# Patient Record
Sex: FEMALE | Race: WHITE | NOT HISPANIC OR LATINO | Employment: FULL TIME | URBAN - METROPOLITAN AREA
[De-identification: names, ages, dates, MRNs, and addresses within clinical notes are randomized per-mention and may not be internally consistent; named-entity substitution may affect disease eponyms.]

---

## 2023-02-03 ENCOUNTER — TRANSFERRED RECORDS (OUTPATIENT)
Dept: HEALTH INFORMATION MANAGEMENT | Facility: CLINIC | Age: 33
End: 2023-02-03
Payer: COMMERCIAL

## 2023-02-13 ENCOUNTER — MEDICAL CORRESPONDENCE (OUTPATIENT)
Dept: HEALTH INFORMATION MANAGEMENT | Facility: CLINIC | Age: 33
End: 2023-02-13
Payer: COMMERCIAL

## 2023-02-15 ENCOUNTER — TRANSCRIBE ORDERS (OUTPATIENT)
Dept: OTHER | Age: 33
End: 2023-02-15

## 2023-02-15 DIAGNOSIS — R07.0 THROAT PAIN: Primary | ICD-10-CM

## 2023-02-15 DIAGNOSIS — R09.89 THROAT CLEARING: ICD-10-CM

## 2023-04-28 NOTE — TELEPHONE ENCOUNTER
FUTURE VISIT INFORMATION      FUTURE VISIT INFORMATION:    Date: 7/3/23    Time: 1pm    Location: [facility]  REFERRAL INFORMATION:    Referring provider:  Dr Angel Cannon    Referring providers clinic:  [facility]    Reason for visit/diagnosis  New per ref/pt, chronic throat clearing, Referred by: Dr Angel Cannon @ [facility] Phone: [phone], Fax: [phone], ref-recs i9n epic, confirmed [facility]    RECORDS REQUESTED FROM:       Clinic name Comments Records Status Imaging Status   [facility]  2/13/23, 2/3/23- referral and note from Dr Angel Cannon Scanned in epic

## 2023-05-14 ENCOUNTER — HEALTH MAINTENANCE LETTER (OUTPATIENT)
Age: 33
End: 2023-05-14

## 2023-06-30 ENCOUNTER — TELEPHONE (OUTPATIENT)
Dept: OTOLARYNGOLOGY | Facility: CLINIC | Age: 33
End: 2023-06-30
Payer: COMMERCIAL

## 2023-06-30 NOTE — TELEPHONE ENCOUNTER
Left vm message for patient in regards to confirming upcoming appt on 7\3. Call center number provided for call back

## 2023-07-03 ENCOUNTER — PRE VISIT (OUTPATIENT)
Dept: OTOLARYNGOLOGY | Facility: CLINIC | Age: 33
End: 2023-07-03

## 2023-07-03 ENCOUNTER — VIRTUAL VISIT (OUTPATIENT)
Dept: OTOLARYNGOLOGY | Facility: CLINIC | Age: 33
End: 2023-07-03
Attending: STUDENT IN AN ORGANIZED HEALTH CARE EDUCATION/TRAINING PROGRAM
Payer: COMMERCIAL

## 2023-07-03 VITALS
HEIGHT: 64 IN | WEIGHT: 144 LBS | BODY MASS INDEX: 24.59 KG/M2 | SYSTOLIC BLOOD PRESSURE: 130 MMHG | DIASTOLIC BLOOD PRESSURE: 85 MMHG | HEART RATE: 103 BPM

## 2023-07-03 DIAGNOSIS — J38.7 LESION OF LARYNX: ICD-10-CM

## 2023-07-03 DIAGNOSIS — R09.89 CHRONIC THROAT CLEARING: Primary | ICD-10-CM

## 2023-07-03 DIAGNOSIS — R09.89 THROAT CLEARING: ICD-10-CM

## 2023-07-03 DIAGNOSIS — J38.7 IRRITABLE LARYNX SYNDROME: ICD-10-CM

## 2023-07-03 DIAGNOSIS — R09.89 THROAT TIGHTNESS: Primary | ICD-10-CM

## 2023-07-03 DIAGNOSIS — J38.3 PARADOXICAL VOCAL FOLD MOVEMENT: ICD-10-CM

## 2023-07-03 DIAGNOSIS — J38.7 LARYNGEAL HYPERFUNCTION: ICD-10-CM

## 2023-07-03 PROCEDURE — 99203 OFFICE O/P NEW LOW 30 MIN: CPT | Mod: 25 | Performed by: OTOLARYNGOLOGY

## 2023-07-03 PROCEDURE — 92524 BEHAVRAL QUALIT ANALYS VOICE: CPT | Mod: GN | Performed by: SPEECH-LANGUAGE PATHOLOGIST

## 2023-07-03 PROCEDURE — 31575 DIAGNOSTIC LARYNGOSCOPY: CPT | Performed by: OTOLARYNGOLOGY

## 2023-07-03 PROCEDURE — 92507 TX SP LANG VOICE COMM INDIV: CPT | Mod: GN | Performed by: SPEECH-LANGUAGE PATHOLOGIST

## 2023-07-03 RX ORDER — LEVONORGESTREL 13.5 MG/1
INTRAUTERINE DEVICE INTRAUTERINE
COMMUNITY
End: 2024-03-15

## 2023-07-03 ASSESSMENT — PAIN SCALES - GENERAL: PAINLEVEL: NO PAIN (0)

## 2023-07-03 NOTE — PATIENT INSTRUCTIONS
1.  You were seen in the ENT Clinic today by Dr. Rodriguez. If you have any questions or concerns after your appointment, please call [phone]. Press option #1 for scheduling related needs. Press option #3 for Nurse advice.    2. Plan is to return to clinic 4 months      Kasandra England RN  [phone]  [facility] - Otolaryngology

## 2023-07-03 NOTE — LETTER
7/3/2023       RE: Diana Rothman  [address]  [place] [address]     Dear Colleague,    Thank you for referring your patient, Diana Rothman, to the [facility]ICE [facility] [place] [facility]. Please see a copy of my visit note below.    Diana Rothman is a 33 year old female who is being evaluated via a billable video visit.      Diana has been notified and verbally consented to the following:   This video visit will be conducted between you and your provider.  Patient has opted to conduct today's video visit vs an in-person appointment.   Video visits are billed at different rates depending on your insurance coverage. Please reach out to your insurance provider with any questions.   If during the course of the call the provider feels the appointment is not appropriate, you will not be charged for this service.  Provider has received verbal consent for billable virtual visit from the patient? Yes  Will anyone else be joining your video visit? No    Call initiated at: 12:55pm   Type of Visit Platform Used: [employer]well Video for patient history but then transitioned to in person for laryngeal examination.   Location of provider: [facility]  Location of patient: [facility]  Luis Mcneil Jr., M.D., F.A.C.S.  Annie Rodriguez M.D., M.P.H.  Khalida Fairchild M.D.  Azeb Olvera, Ph.D., CCC-SLP  Himanshu Ramesh, Ph.D., CCC-SLP  Dora Prince M.M. (voice), M.A., CCC-SLP  Dorie Manuel M.A., CCC-SLP  ANURAG MedinaS., CCC-SLP  ABIOLA Oliveira (voice), M.S., CCC-SLP    [facility]  VOICE/SPEECH/BREATHING EVALUATION AND LARYNGEAL EXAMINATION REPORT    Patient: Diana Rothman  Date of Visit: 7/3/2023    Clinician: Dorie Manuel M.A., CCC-SLP  Seen in conjunction with: Dr. Annie Rodriguez  Referring Physician:  Kassandra Ortiz MD    CHIEF COMPLAINT:  Chronic throat  "clearing    HISTORY  Diana Rothman is a 33 year old female presenting today for evaluation of chronic throat clearing.  She clears her throat often because she feels like she always has something in her throat. Whenever she talks, she has to clear her throat. She comments that her mom frequently throat clears.     Please refer to Dr. Rodriguez s dictation for a more complete history and impressions.     VOICE  No complaints  Current Symptoms:  She throat clears before she speaks because she feels that \"it will interact\" with her voice.   She may get a hoarse voice every once in while if she has been too loud but this is not a common thing.   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  She feels a \"catch\" in her throat and sometimes a \"mucus\" feeling  She will cough every once in while to \"get whatever is there to get out.\"   The throat clearing happens all the time and she hasn't noticed a specific pattern  She notices it more when she is not talking, but she doesn't notice it as much when talking. Again, she isn't sure.  No coughing or throat clearing at night   Improves with: she hasn't noticed anything to make it better  Worsens with: She hasn't noticed anything that makes it worse   her cough/ throat clearing triggers include: none    SWALLOWING  No complaints    BREATHING  No complaints  Current Symptoms:   Diagnosed with asthmatic bronchitis because her \"lungs were wheezing.\"   She can feel winded with exercising or doing house projects (things that have more physical exertion)    THROAT  Current Symptoms:   Her throat gets tight when she is out of breath but she knows that she can still breathe    OTHER PERTINENT HISTORY  Reflux;   Very rarely (once a " year)  She will feel drainage from her nose whenever she feels sick.  She has had tonsillitis frequently this past year.   She feels like she has a sore throat more often than normal but it doesn't affect her with work .  Non-smoker and she drinks alcohol a few times a week   Please refer to Dr. Rodriguez's note and chart for additional history    OBJECTIVE FINDINGS  VOICE/ SPEECH/ NON-COMMUNICATIVE LARYNGEAL BEHAVIORS EVALUATION  An evaluation of the voice and upper airway was accomplished today; salient features are as follows:  Palpation of the laryngeal area shows:  No tenderness of the thyrohyoid area   Reduced thyrohyoid space   Cough/ Throat clear:  Frequent  Dry  No overt tension is evident.  Voice quality is characterized by  Frequent vocal arana  Habitual pitch was not formally tested, but is judged to be WNL and appropriate  Loudness is WNL and appropriate for the setting  A singing task shows voice quality is slightly improved but overall consistent between speech and singing  GLOBAL ASSESSMENT OF DYSPHONIA: 2 /100    LARYNGEAL EXAMINATION    Endoscopic laryngeal examination was performed by:  Dr. Rodriguez  I assisted Dr. Rodriguez with the protocol of instructions for the patient.  Type of exam:   Flexible endoscopy; topical anesthesia with 2% Lidocaine and 0.025% oxymetazoline was applied to both nares.    This exam shows:  Laryngeal and Vocal Fold Mucosa  Erythema of the medial arytenoid walls   No remarkable signs of reflux  Minimal presence of bubbly and sticky secretions on the vocal folds and throughout the laryngeal area  Status of vocal fold mucosa:   White granulation observed by the left vocal fold process at the left medial arytenoid wall  Very slight varix observed at the left vocal fold near the medial edge (mid fold area)  Otherwise within normal limits, with no lesions and straight vibratory margins  Narrow Band Imaging yielded the following: apparent white granulation observed by the left vocal  "fold process at the left medial arytenoid wall. Mild erythema observed bilaterally on the vocal folds L>R.    Neurological and Functional Integrity of the Larynx  Vocal folds are mobile and meet at midline; movement is brisk and symmetric; exam is neurologically normal   Airway is adequate  Slight droopiness observed of the right arytenoid at rest.  Elongation of the vocal folds for pitch increase is WNL   With simulated heavy breathing, anterior rocking of arytenoids was seen. However, the vocal folds remained opened.    Supraglottic Function and Therapy Probes  Variable mild-moderate four-way constriction of the supraglottic larynx during connected speech  Supraglottic function during singing is improved  Therapy probes show   She was responsive to the rounded lip inhale, puppy sniffs, and exhaling on \"Sh\" and like blowing out candles.    Dr. Fairchild and I reviewed this laryngeal exam with Ms. Rothman today, and I provided pertinent explanations:  Endoscopic findings are consistent with audio-perceptual assessment and patient Hx/complaints.  her symptoms are accounted for by variable mild-moderate four-way constriction of the supraglottic larynx during connected speech  Because there appears to be a functional component to her symptoms, she would most likely benefit from functional speech therapy.    THERAPEUTIC ACTIVITIES  Today Ms. Rothman participated in the following therapeutic activities:  I provided instruction for techniques and strategies to reduce the chronic cough/throat clearing  alternative behaviors such as voiceless glottic coup, sipping water, gargling,swallowing, etc. were taught  strategies for reducing mucosal irritation were taught  Gargle protocol instructions were provided  I provided an after visit summary to help facilitate practice.    ASSESSMENT / PLAN  IMPRESSIONS:  This evaluation has resulted in the following diagnosis/diagnoses for Ms. 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)    Laryngeal evaluation demonstrated white granulation observed by the left vocal fold process at the left medial arytenoid wall; very slight varix observed at the left vocal fold near the medial edge (mid fold area); Narrow Band Imaging yielded the following: apparent white granulation observed by the left vocal fold process at the left medial arytenoid wall and mild erythema observed bilaterally on the vocal folds L>R; erythema of the medial arytenoid walls; with simulated heavy breathing, anterior rocking of arytenoids was seen, however, the vocal folds remained opened; she was responsive to the rounded lip inhale, puppy sniffs, and exhaling on \"Sh\" and like blowing out candles; slight droopiness observed of the right arytenoid at rest; variable mild-moderate four-way constriction of the supraglottic larynx during connected speech  Perceptual evaluation demonstrated frequent vocal arana.    STIMULABILITY: results of therapy probes during perceptual and laryngeal evaluation demonstrate improvement with use of rescue breathing strategies  RECOMMENDATIONS:   A course of speech therapy is recommended to help reduce throat clear and mucosal irritation.  She demonstrates a Good prognosis for improvement given adherence to therapeutic recommendations. Therapeutic   Positive indicators: commitment to process; diagnosis is known to respond to functional treatment;   Negative indicators: none  We briefly began therapy today, working on strategies to reduce throat clearing and laryngeal irritation.     TREATMENT PLAN  Speech therapy    DURATION/FREQUENCY OF TREATMENT  4 weekly or bi-weekly one-hour sessions, with 1 monthly one-hour follow-up sessions    GOALS  Patient goal:    To understand the problem and fix it as much as possible  To reduce her cough to acceptable " levels    Long-term goal(s): In 9 months, Ms. Rothman will:  1.  Report a week with no more than two episodes of coughing or throat-clearing per day, that do not last more than two seconds  Report resolution of symptoms, and use of optimal voice quality and comfort to meet personal, social, and professional needs, 90% of the time during a typical week of vocal activities    This treatment plan was developed with the patient who agreed with the recommendations.    TOTAL SERVICE TIME: 60 minutes  EVALUATION OF VOICE AND RESONANCE (81842)  TREATMENT (35346)  NO CHARGE FACILITY FEE (21126)      Dorie Manuel M.A., CCC-SLP  Speech-Language Pathologist  [facility]  [email or web address]  She/Her/Hers       Speech recognition software may have been used in this documentation; input is reviewed before signature to the best of my ability.       Again, thank you for allowing me to participate in the care of your patient.      Sincerely,    Speech Language Pathologist

## 2023-07-03 NOTE — PROGRESS NOTES
Dear Dr. Cannon:    I had the pleasure of meeting Diana Rothman in consultation at the [facility] of the [facility] at your request, for evaluation of chronic throat-clearing. The note from our visit follows. Speech recognition software may have been used in the documentation below; input is reviewed before signature to the best of my ability. I appreciate the opportunity to participate in the care of this pleasant patient.    Please feel free to contact me with any questions.    Sincerely yours,      Annie Rodriguez M.D., M.P.H.  , Laryngology  Director, [facility]  Otolaryngology- Head & Neck Surgery  [phone]          =====    HISTORY OF PRESENT ILLNESS:   Diana Rothman is a pleasant 33-year-old female, generally healthy, who presents with a long history of chronic throat-clearing. Symptoms include dry throat and mucus in throat.      Voice  No concerns.       Swallowing  No concerns.       Cough/Throat-clearing  Throat clearing is her primary concern. This has been going on for at least all of her adulthood.    1-2x/hour, she feels a catch or a mucus feeling in her throat. It feels like she needs to throat-clear before speaking.    The cough is usually dry/nonproductive, and starts with a sensation in the throat. Sometimes she coughs to get rid of the feeling. It is difficult to tell if there is a pattern. Her mother also does the same thing.     This is stable over time.      Breathing  She does get winded with physical exertion, her throat can get tight but she is still able to breathe.    She had been diagnosed with asthmatic bronchitis in the past.      Throat discomfort  As above.      Reflux-type symptoms  She experiences heartburn/indigestion rarely. She is not taking reflux medications.      Other  She does not generally experience post-nasal drainage.  She had tonsillitis multiple times  last year.      Prior Epic/outside records were reviewed for this visit, includin23 Dr. Cannon      MEDICATIONS:     Current Outpatient Medications   Medication Sig Dispense Refill     levonorgestrel (RADHA) 13.5 MG IUD Radha         ALLERGIES:  No Known Allergies    PAST MEDICAL HISTORY: No past medical history on file.     PAST SURGICAL HISTORY: No past surgical history on file.   No PSH    HABITS/SOCIAL HISTORY:    Social History     Tobacco Use     Smoking status: Not on file     Smokeless tobacco: Not on file   Substance Use Topics     Alcohol use: Not on file     No smoking.  Alcohol few times a week, rare heavy drinking. No definite impact on throat clearing.    FAMILY HISTORY:  No family history on file. Noncontributory.    REVIEW OF SYSTEMS:  Comprehensive 11 point review of systems was reviewed. Positives are as noted below; pertinent findings are as noted in the HPI.     Patient Supplied Answers to Review of Systems      2023    10:26 AM    ENT ROS   Constitutional Weight gain    Appetite change    Problems with sleep   Psychology Frequently feeling anxious   Ears, Nose, Throat Nasal congestion or drainage   Gastrointestinal/Genitourinary Unexplained nausea/vomiting   Allergy/Immunology Skin changes   Endocrine Thirst       PHYSICAL EXAMINATION:  General: The patient was alert and conversant, and in no acute distress.    Eyes: PERRL, conjunctiva and lids normal, sclera nonicteric.  Nose: Anterior rhinoscopy: no gross abnormalities. no  bleeding; no  mucopurulence; septum grossly normal, mild mucoid drainage and/or crusting.  Oral cavity/oropharynx: No masses or lesions. Dentition in good condition. Floor of mouth and oral tongue soft to palpation. Tongue mobility and palate elevation intact and symmetric.  Ears: Normal auricles, external auditory canals bilaterally. Visualized portions of tympanic membranes normal bilaterally. Moderate cerumen bilaterally.  Neck: No palpable cervical  lymphadenopathy. There was no significant tenderness to palpation of the thyrohyoid space, which was narrow. No obvious thyroid abnormality. Landmarks palpable.  Resp: Breathing comfortably, no stridor or stertor.  Neuro: Symmetric facial function. Other cranial nerves as documented above.  Psych: Normal affect, pleasant and cooperative.  Voice/speech: No significant dysphonia.  Extremities: No cyanosis, clubbing, or edema of the upper extremities.      PROCEDURE:   Flexible fiberoptic laryngoscopy  Indications: This procedure was warranted to evaluate the patient's laryngeal anatomy and function. Risks, benefits, and alternatives were discussed.  Description: After written informed consent was obtained, a time-out was performed to confirm patient identity, procedure, and procedure site. Topical 3% lidocaine with 0.25% phenylephrine was applied to the nasal cavities. I performed the endoscopy and no complications were apparent.  Performed by: Annie Rodriguez MD MPH  SLP: Dorie Manuel MA, CCC-SLP   Findings: Normal nasopharynx. Normal base of tongue, valleculae, and epiglottis. The right true vocal fold demonstrated normal mobility. The left true vocal fold demonstrated normal mobility. The medial edges of the true vocal folds appeared smooth and straight. No focal mucosal lesions were observed on the true vocal folds. Glissade produced appropriate elongation. There was mild to moderate supraglottic recruitment with connected speech. Mucosa of the false vocal folds, aryepiglottic folds, piriform sinuses, and posterior glottis unremarkable with the exception of mucosal irritation along medial surface of left arytenoid. Airway was patent, but with simulated heavy breathing, anterior rocking of arytenoids was seen. Response to the therapy probes was good. Similar findings on NBI.                   IMPRESSION AND PLAN:   Diana Rothman presents with chronic throat clearing associated with mucosal irritation  along the left medial arytenoid and mild dynamic/inducible laryngeal obstruction. Based on history, she does not appear to have reflux or allergies as contributing factors, although these have not been formally assessed.    I recommended speech therapy as initial primary management, with goals including reducing laryngeal irritability, decreasing vocal fold trauma, improving respiratory/phonatory coordination and improving respiratory mechanics.       I asked the patient to return in four months, or sooner as needed.  I appreciate the opportunity to participate in the care of this patient.     I spent a total of 37 minutes on 7/3/2023 in chart review, review of tests, patient visit, documentation, care coordination, and/or discussion with other providers about the issues documented above, separate from any documented procedure(s).

## 2023-07-03 NOTE — LETTER
7/3/2023      RE: Diana Rothman  [address]  [place] [address]       Dear Dr. Cannon:    I had the pleasure of meeting Diana Rothman in consultation at the [facility] of the [facility] at your request, for evaluation of chronic throat-clearing. The note from our visit follows. Speech recognition software may have been used in the documentation below; input is reviewed before signature to the best of my ability. I appreciate the opportunity to participate in the care of this pleasant patient.    Please feel free to contact me with any questions.    Sincerely yours,      Annie Rodriguez M.D., M.P.H.  , Laryngology  Director, [facility] Voice [facility]  Otolaryngology- Head & Neck Surgery  [phone]          =====    HISTORY OF PRESENT ILLNESS:   Diana Rothman is a pleasant 33-year-old female, generally healthy, who presents with a long history of chronic throat-clearing. Symptoms include dry throat and mucus in throat.      Voice  No concerns.       Swallowing  No concerns.       Cough/Throat-clearing  Throat clearing is her primary concern. This has been going on for at least all of her adulthood.    1-2x/hour, she feels a catch or a mucus feeling in her throat. It feels like she needs to throat-clear before speaking.    The cough is usually dry/nonproductive, and starts with a sensation in the throat. Sometimes she coughs to get rid of the feeling. It is difficult to tell if there is a pattern. Her mother also does the same thing.     This is stable over time.      Breathing  She does get winded with physical exertion, her throat can get tight but she is still able to breathe.    She had been diagnosed with asthmatic bronchitis in the past.      Throat discomfort  As above.      Reflux-type symptoms  She experiences heartburn/indigestion rarely. She is not taking reflux medications.      Other  She  does not generally experience post-nasal drainage.  She had tonsillitis multiple times last year.      Prior Epic/outside records were reviewed for this visit, includin23 Dr. Cannon      MEDICATIONS:     Current Outpatient Medications   Medication Sig Dispense Refill    levonorgestrel (RAHDA) 13.5 MG IUD Radha         ALLERGIES:  No Known Allergies    PAST MEDICAL HISTORY: No past medical history on file.     PAST SURGICAL HISTORY: No past surgical history on file.   No PSH    HABITS/SOCIAL HISTORY:    Social History     Tobacco Use    Smoking status: Not on file    Smokeless tobacco: Not on file   Substance Use Topics    Alcohol use: Not on file     No smoking.  Alcohol few times a week, rare heavy drinking. No definite impact on throat clearing.    FAMILY HISTORY:  No family history on file. Noncontributory.    REVIEW OF SYSTEMS:  Comprehensive 11 point review of systems was reviewed. Positives are as noted below; pertinent findings are as noted in the HPI.     Patient Supplied Answers to Review of Systems      2023    10:26 AM    ENT ROS   Constitutional Weight gain    Appetite change    Problems with sleep   Psychology Frequently feeling anxious   Ears, Nose, Throat Nasal congestion or drainage   Gastrointestinal/Genitourinary Unexplained nausea/vomiting   Allergy/Immunology Skin changes   Endocrine Thirst       PHYSICAL EXAMINATION:  General: The patient was alert and conversant, and in no acute distress.    Eyes: PERRL, conjunctiva and lids normal, sclera nonicteric.  Nose: Anterior rhinoscopy: no gross abnormalities. no  bleeding; no  mucopurulence; septum grossly normal, mild mucoid drainage and/or crusting.  Oral cavity/oropharynx: No masses or lesions. Dentition in good condition. Floor of mouth and oral tongue soft to palpation. Tongue mobility and palate elevation intact and symmetric.  Ears: Normal auricles, external auditory canals bilaterally. Visualized portions of tympanic  membranes normal bilaterally. Moderate cerumen bilaterally.  Neck: No palpable cervical lymphadenopathy. There was no significant tenderness to palpation of the thyrohyoid space, which was narrow. No obvious thyroid abnormality. Landmarks palpable.  Resp: Breathing comfortably, no stridor or stertor.  Neuro: Symmetric facial function. Other cranial nerves as documented above.  Psych: Normal affect, pleasant and cooperative.  Voice/speech: No significant dysphonia.  Extremities: No cyanosis, clubbing, or edema of the upper extremities.      PROCEDURE:   Flexible fiberoptic laryngoscopy  Indications: This procedure was warranted to evaluate the patient's laryngeal anatomy and function. Risks, benefits, and alternatives were discussed.  Description: After written informed consent was obtained, a time-out was performed to confirm patient identity, procedure, and procedure site. Topical 3% lidocaine with 0.25% phenylephrine was applied to the nasal cavities. I performed the endoscopy and no complications were apparent.  Performed by: Annie Rodriguez MD MPH  SLP: Dorie Manuel MA, CCC-SLP   Findings: Normal nasopharynx. Normal base of tongue, valleculae, and epiglottis. The right true vocal fold demonstrated normal mobility. The left true vocal fold demonstrated normal mobility. The medial edges of the true vocal folds appeared smooth and straight. No focal mucosal lesions were observed on the true vocal folds. Glissade produced appropriate elongation. There was mild to moderate supraglottic recruitment with connected speech. Mucosa of the false vocal folds, aryepiglottic folds, piriform sinuses, and posterior glottis unremarkable with the exception of mucosal irritation along medial surface of left arytenoid. Airway was patent, but with simulated heavy breathing, anterior rocking of arytenoids was seen. Response to the therapy probes was good. Similar findings on NBI.                   IMPRESSION AND PLAN:   Diana  CHRISTINA Rothman presents with chronic throat clearing associated with mucosal irritation along the left medial arytenoid and mild dynamic/inducible laryngeal obstruction. Based on history, she does not appear to have reflux or allergies as contributing factors, although these have not been formally assessed.    I recommended speech therapy as initial primary management, with goals including reducing laryngeal irritability, decreasing vocal fold trauma, improving respiratory/phonatory coordination and improving respiratory mechanics.       I asked the patient to return in four months, or sooner as needed.  I appreciate the opportunity to participate in the care of this patient.     I spent a total of 37 minutes on 7/3/2023 in chart review, review of tests, patient visit, documentation, care coordination, and/or discussion with other providers about the issues documented above, separate from any documented procedure(s).        Annie Rodriguez MD

## 2023-07-03 NOTE — PROGRESS NOTES
Diana Rothman is a 33 year old female who is being evaluated via a billable video visit.      Diana has been notified and verbally consented to the following:     This video visit will be conducted between you and your provider.    Patient has opted to conduct today's video visit vs an in-person appointment.     Video visits are billed at different rates depending on your insurance coverage. Please reach out to your insurance provider with any questions.     If during the course of the call the provider feels the appointment is not appropriate, you will not be charged for this service.  Provider has received verbal consent for billable virtual visit from the patient? Yes  Will anyone else be joining your video visit? No    Call initiated at: 12:55pm   Type of Visit Platform Used: [employer] Video for patient history but then transitioned to in person for laryngeal examination.   Location of provider: [facility]  Location of patient: [facility]  Luis Mcneil Jr., M.D., F.A.C.S.  Annie Rodriguez M.D., M.P.H.  Khalida Fairchild M.D.  Azeb Olvera, Ph.D., CCC-SLP  Himanshu Ramesh, Ph.D., CCC-SLP  Dora Prince M.M. (voice), M.A., CCC-SLP  Dorie Manuel M.A., CCC-SLP  Rubia Perkins M.S., CCC-SLP  ABIOLA Oliveira (voice), M.S., CCC-SLP    [facility]  VOICE/SPEECH/BREATHING EVALUATION AND LARYNGEAL EXAMINATION REPORT    Patient: Diana Rothman  Date of Visit: 7/3/2023    Clinician: Dorie Manuel M.A., CCC-SLP  Seen in conjunction with: Dr. Annie Rodriguez  Referring Physician:  Kassandra Ortiz MD    CHIEF COMPLAINT:  Chronic throat clearing    HISTORY  Diana Rothman is a 33 year old female presenting today for evaluation of chronic throat clearing.  She clears her throat often because she feels like she always has something in her throat. Whenever she talks, she has to clear her throat. She comments that her mom frequently throat  "clears.     Please refer to Dr. Rodriguez s dictation for a more complete history and impressions.     VOICE    No complaints    Current Symptoms:    She throat clears before she speaks because she feels that \"it will interact\" with her voice.     She may get a hoarse voice every once in while if she has been too loud but this is not a common thing.     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    She feels a \"catch\" in her throat and sometimes a \"mucus\" feeling    She will cough every once in while to \"get whatever is there to get out.\"     The throat clearing happens all the time and she hasn't noticed a specific pattern    She notices it more when she is not talking, but she doesn't notice it as much when talking. Again, she isn't sure.    No coughing or throat clearing at night     Improves with: she hasn't noticed anything to make it better    Worsens with: She hasn't noticed anything that makes it worse     her cough/ throat clearing triggers include: none    SWALLOWING    No complaints    BREATHING    No complaints    Current Symptoms:     Diagnosed with asthmatic bronchitis because her \"lungs were wheezing.\"     She can feel winded with exercising or doing house projects (things that have more physical exertion)    THROAT    Current Symptoms:     Her throat gets tight when she is out of breath but she knows that she can still breathe    OTHER PERTINENT HISTORY    Reflux;   o Very rarely (once a year)    She will feel drainage from her nose whenever she feels sick.    She has had tonsillitis frequently this past year.   o She feels like she has a sore throat more often than normal but it doesn't affect her with work .    Non-smoker and she " drinks alcohol a few times a week     Please refer to Dr. Rodriguez's note and chart for additional history    OBJECTIVE FINDINGS  VOICE/ SPEECH/ NON-COMMUNICATIVE LARYNGEAL BEHAVIORS EVALUATION  An evaluation of the voice and upper airway was accomplished today; salient features are as follows:    Palpation of the laryngeal area shows:    No tenderness of the thyrohyoid area     Reduced thyrohyoid space     Cough/ Throat clear:    Frequent    Dry    No overt tension is evident.    Voice quality is characterized by    Frequent vocal arana    Habitual pitch was not formally tested, but is judged to be WNL and appropriate    Loudness is WNL and appropriate for the setting    A singing task shows voice quality is slightly improved but overall consistent between speech and singing    GLOBAL ASSESSMENT OF DYSPHONIA: 2 /100    LARYNGEAL EXAMINATION    Endoscopic laryngeal examination was performed by:  Dr. Rodriguez  I assisted Dr. Rodriguez with the protocol of instructions for the patient.  Type of exam:   Flexible endoscopy; topical anesthesia with 2% Lidocaine and 0.025% oxymetazoline was applied to both nares.    This exam shows:  Laryngeal and Vocal Fold Mucosa    Erythema of the medial arytenoid walls     No remarkable signs of reflux    Minimal presence of bubbly and sticky secretions on the vocal folds and throughout the laryngeal area    Status of vocal fold mucosa:   o White granulation observed by the left vocal fold process at the left medial arytenoid wall  o Very slight varix observed at the left vocal fold near the medial edge (mid fold area)  o Otherwise within normal limits, with no lesions and straight vibratory margins    Narrow Band Imaging yielded the following: apparent white granulation observed by the left vocal fold process at the left medial arytenoid wall. Mild erythema observed bilaterally on the vocal folds L>R.    Neurological and Functional Integrity of the Larynx    Vocal folds are mobile and meet at  "midline; movement is brisk and symmetric; exam is neurologically normal     Airway is adequate    Slight droopiness observed of the right arytenoid at rest.    Elongation of the vocal folds for pitch increase is WNL     With simulated heavy breathing, anterior rocking of arytenoids was seen. However, the vocal folds remained opened.    Supraglottic Function and Therapy Probes    Variable mild-moderate four-way constriction of the supraglottic larynx during connected speech    Supraglottic function during singing is improved    Therapy probes show   o She was responsive to the rounded lip inhale, puppy sniffs, and exhaling on \"Sh\" and like blowing out candles.    Dr. Fairchild and I reviewed this laryngeal exam with Ms. Rothman today, and I provided pertinent explanations:    Endoscopic findings are consistent with audio-perceptual assessment and patient Hx/complaints.    her symptoms are accounted for by variable mild-moderate four-way constriction of the supraglottic larynx during connected speech    Because there appears to be a functional component to her symptoms, she would most likely benefit from functional speech therapy.    THERAPEUTIC ACTIVITIES  Today Ms. Rothman participated in the following therapeutic activities:    I provided instruction for techniques and strategies to reduce the chronic cough/throat clearing  o alternative behaviors such as voiceless glottic coup, sipping water, gargling,swallowing, etc. were taught  o strategies for reducing mucosal irritation were taught  - Gargle protocol instructions were provided    I provided an after visit summary to help facilitate practice.    ASSESSMENT / PLAN  IMPRESSIONS:  This evaluation has resulted in the following diagnosis/diagnoses for Ms. 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)      Laryngeal " "evaluation demonstrated white granulation observed by the left vocal fold process at the left medial arytenoid wall; very slight varix observed at the left vocal fold near the medial edge (mid fold area); Narrow Band Imaging yielded the following: apparent white granulation observed by the left vocal fold process at the left medial arytenoid wall and mild erythema observed bilaterally on the vocal folds L>R; erythema of the medial arytenoid walls; with simulated heavy breathing, anterior rocking of arytenoids was seen, however, the vocal folds remained opened; she was responsive to the rounded lip inhale, puppy sniffs, and exhaling on \"Sh\" and like blowing out candles; slight droopiness observed of the right arytenoid at rest; variable mild-moderate four-way constriction of the supraglottic larynx during connected speech    Perceptual evaluation demonstrated frequent vocal arana.    STIMULABILITY: results of therapy probes during perceptual and laryngeal evaluation demonstrate improvement with use of rescue breathing strategies  RECOMMENDATIONS:     A course of speech therapy is recommended to help reduce throat clear and mucosal irritation.    She demonstrates a Good prognosis for improvement given adherence to therapeutic recommendations. Therapeutic     Positive indicators: commitment to process; diagnosis is known to respond to functional treatment;     Negative indicators: none    We briefly began therapy today, working on strategies to reduce throat clearing and laryngeal irritation.     TREATMENT PLAN  Speech therapy    DURATION/FREQUENCY OF TREATMENT  4 weekly or bi-weekly one-hour sessions, with 1 monthly one-hour follow-up sessions    GOALS  Patient goal:    To understand the problem and fix it as much as possible  To reduce her cough to acceptable levels    Long-term goal(s): In 9 months, Ms. Rothman will:  1.  Report a week with no more than two episodes of coughing or throat-clearing per day, that do " not last more than two seconds  Report resolution of symptoms, and use of optimal voice quality and comfort to meet personal, social, and professional needs, 90% of the time during a typical week of vocal activities    This treatment plan was developed with the patient who agreed with the recommendations.    TOTAL SERVICE TIME: 60 minutes  EVALUATION OF VOICE AND RESONANCE (64737)  TREATMENT (38248)  NO CHARGE FACILITY FEE (75787)      Dorie Manuel M.A., CCC-SLP  Speech-Language Pathologist  [facility]  [email or web address]  She/Her/Hers       Speech recognition software may have been used in this documentation; input is reviewed before signature to the best of my ability.

## 2023-07-04 NOTE — PATIENT INSTRUCTIONS
Yash Ortiz,    Sorry for the delay. Here is everything we discussed today. See you soon.    Dorie Manuel M.A., CCC-SLP  Speech-Language Pathologist  [facility]  [email or web address]  She/Her/Hers    Gargling: Gargle in the mornings, evenings, after meals, or anytime you feel irritation.  You can use warm water, regular water, or warm water with saline solution that you spit out.         Saline gargle recipe  8 oz water  1/4 tsp of  Kosher or sea salt (table salt is irritating for your throat)  1/4 tsp of baking soda  (Or use pre-made saline packets provided below)      Cough and Throat Clearing  The biological purpose of the larynx is to protect the airway (trachea and lungs). Coughing and throat clearing are mechanisms that are meant to assist in the protection of our airway. When we feel something such as liquid, food, saliva, dust, etc. near our vocal folds, we will clear our throats and cough as a protective reflex. We also cough or throat clear if our airway is irritated.     Why can chronic coughing and throat clearing be harmful?  A cough is produced by squeezing the vocal folds together, building up pressure in the lungs, and then quickly forcing the vocal folds open to clear away whatever might be getting too close to our airway. This can be traumatic to the vocal folds, irritating them in the same way that our hands would be irritated if they were being slapped together over and over. Coughing for a long period of time can cause the mucosa of the larynx and vocal folds to become hypersensitive, making it feel like something is threatening the airway.  The vocal folds need to cough or throat clear even when there isn't an actual physical threat to the airway.  The chronic coughing or throat clearing results in even more coughing or throat clearing.      Strategies to Reduce Irritation  Your speech-language pathologist will teach you techniques to use muscles optimally, in order to reduce  irritation.  In the meantime, you can start reducing the irritation, using the following strategies:      WHAT TO DO WHEN YOU FEEL THE TICKLE OR URGE TO COUGH:  Alternative Behaviors to coughing  Swallow hard  Sip hot, cold, carbonated, or flavored water  Gargle  Say  eh eh eh eh  silently (for a gentle, non-irritating throat-clear)

## 2023-08-30 ENCOUNTER — TELEPHONE (OUTPATIENT)
Dept: OTOLARYNGOLOGY | Facility: CLINIC | Age: 33
End: 2023-08-30
Payer: COMMERCIAL

## 2023-08-30 NOTE — TELEPHONE ENCOUNTER
LVM for patient regarding rescheduling appt due to provider not in clinic on 10/20. Left new appt time, date and location in VM. Left my direct line for questions and scheduling needs.

## 2023-11-17 ENCOUNTER — VIRTUAL VISIT (OUTPATIENT)
Dept: OTOLARYNGOLOGY | Facility: CLINIC | Age: 33
End: 2023-11-17
Payer: COMMERCIAL

## 2023-11-17 ENCOUNTER — TELEPHONE (OUTPATIENT)
Dept: OTOLARYNGOLOGY | Facility: CLINIC | Age: 33
End: 2023-11-17

## 2023-11-17 DIAGNOSIS — J38.3 PARADOXICAL VOCAL FOLD MOVEMENT: ICD-10-CM

## 2023-11-17 DIAGNOSIS — R09.89 CHRONIC THROAT CLEARING: Primary | ICD-10-CM

## 2023-11-17 DIAGNOSIS — J38.7 LARYNGEAL HYPERFUNCTION: ICD-10-CM

## 2023-11-17 DIAGNOSIS — J38.7 IRRITABLE LARYNX SYNDROME: ICD-10-CM

## 2023-11-17 PROCEDURE — 99207 PR NO CHARGE LOS: CPT | Mod: VID | Performed by: SPEECH-LANGUAGE PATHOLOGIST

## 2023-11-17 NOTE — PROGRESS NOTES
Diana Rothman is a 33 year old female who is being evaluated via a billable video visit.      Diana has been notified and verbally consented to the following:   This video visit will be conducted between you and your provider.  Patient has opted to conduct today's video visit vs an in-person appointment.   Video visits are billed at different rates depending on your insurance coverage. Please reach out to your insurance provider with any questions.   If during the course of the call the provider feels the appointment is not appropriate, you will not be charged for this service.  Provider has received verbal consent for billable virtual visit from the patient? Yes  Will anyone else be joining your video visit? No    Call initiated at: 09:00 am   Type of Visit Platform Used: [employer] Video  Location of provider: Home  Location of patient: [facility]  Luis Mcneil Jr., M.D., F.A.C.S.  Annie Rodriguez M.D., M.P.H.  Khalida Fairchild M.D.  Dora Prince M.M. (voice), M.A., CCC-SLP  Dorie Manuel M.A., CCC-SLP  Azeb Olvera, Ph.D., CCC-SLP  Himanshu Ramesh, Ph.D., CCC-SLP  Rubia Perkins M.S., CCC-SLP  Quintin Causey M.M., M.A., CCC-SLP  ABIOLA Oliveira (voice), M.S., CCC-SLP    [facility]  VOICE/SPEECH/BREATHING THERAPY PROGRESS REPORT    Patient: Diana Rothman  Date of Service: 11/17/2023    Date of Last Service: 07/03/2023  Referring physician: Dr. Rodriguez  Impressions from initial evaluation on 07/03/2023: This evaluation has resulted in the following diagnosis/diagnoses for Ms. 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)    Laryngeal evaluation demonstrated white granulation observed by the left vocal fold process at the left medial arytenoid wall; very slight varix observed at the left vocal fold near the medial edge (mid  "fold area); Narrow Band Imaging yielded the following: apparent white granulation observed by the left vocal fold process at the left medial arytenoid wall and mild erythema observed bilaterally on the vocal folds L>R; erythema of the medial arytenoid walls; with simulated heavy breathing, anterior rocking of arytenoids was seen, however, the vocal folds remained opened; she was responsive to the rounded lip inhale, puppy sniffs, and exhaling on \"Sh\" and like blowing out candles; slight droopiness observed of the right arytenoid at rest; variable mild-moderate four-way constriction of the supraglottic larynx during connected speech  Perceptual evaluation demonstrated frequent vocal arana.    I had the pleasure of seeing Ms. 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 She didn't realized the rule that she has to be in [place] for therapy, so she is calling to reschedule this appointment. She has had a busy fall with work and due to this she has had to cancel our sessions. She is supposed to follow up with Dr. Rodriguez on November 27th, but we discussed her possibly rescheduling since she has not had therapy yet.     Plan: I will see Ms. Rothman in 12/01/2023 to work on education, modification, and carryover of therapeutic activities to more complex activities.    TOTAL SERVICE TIME: 5 minutes  NO CHARGE FACILITY FEE    Dorie Manuel M.A., CCC-SLP  Speech-Language Pathologist  [facility]  [email or web address]  She/Her/Hers     Speech recognition software may have been used in this documentation; input is reviewed before signature to the best of my ability.     "

## 2023-11-17 NOTE — LETTER
11/17/2023       RE: Diana Rothman  [address]  [place] [address]     Dear Colleague,    Thank you for referring your patient, Diana Rothman, to the [facility] VOICE [facility]INIC [facility]. Please see a copy of my visit note below.    Diana Rothman is a 33 year old female who is being evaluated via a billable video visit.      Diana has been notified and verbally consented to the following:   This video visit will be conducted between you and your provider.  Patient has opted to conduct today's video visit vs an in-person appointment.   Video visits are billed at different rates depending on your insurance coverage. Please reach out to your insurance provider with any questions.   If during the course of the call the provider feels the appointment is not appropriate, you will not be charged for this service.  Provider has received verbal consent for billable virtual visit from the patient? Yes  Will anyone else be joining your video visit? No    Call initiated at: 09:00 am   Type of Visit Platform Used: Rabbit  Location of provider: Home  Location of patient: [facility] VOICE [facility]  Luis Mcneil Jr., M.D., F.A.C.S.  Annie Rodriguez M.D., M.P.H.  Khalida Fairchild M.D.  Dora Prince M.M. (voice), M.A., CCC-SLP  Dorie Manuel M.A., CCC-SLP  Azeb Olvera, Ph.D., CCC-SLP  Himanshu Ramesh, Ph.D., CCC-SLP  Rubia Perkins M.S., CCC-SLP  Quintin Causey M.M., M.A., CCC-SLP  ABIOLA Oliveira (voice), M.S., CCC-SLP    [facility]  VOICE/SPEECH/BREATHING THERAPY PROGRESS REPORT    Patient: Diana Rothman  Date of Service: 11/17/2023    Date of Last Service: 07/03/2023  Referring physician: Dr. Rodriguez  Impressions from initial evaluation on 07/03/2023: This evaluation has resulted in the following diagnosis/diagnoses for Ms. 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)    Laryngeal evaluation demonstrated white granulation observed by the left vocal fold process at the left medial arytenoid wall; very slight varix observed at the left vocal fold near the medial edge (mid fold area); Narrow Band Imaging yielded the following: apparent white granulation observed by the left vocal fold process at the left medial arytenoid wall and mild erythema observed bilaterally on the vocal folds L>R; erythema of the medial arytenoid walls; with simulated heavy breathing, anterior rocking of arytenoids was seen, however, the vocal folds remained opened; she was responsive to the rounded lip inhale, puppy sniffs, and exhaling on \"Sh\" and like blowing out candles; slight droopiness observed of the right arytenoid at rest; variable mild-moderate four-way constriction of the supraglottic larynx during connected speech  Perceptual evaluation demonstrated frequent vocal arana.    I had the pleasure of seeing Ms. 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 She didn't realized the rule that she has to be in [place] for therapy, so she is calling to reschedule this appointment. She has had a busy fall with work and due to this she has had to cancel our sessions. She is supposed to follow up with Dr. Rodriguez on November 27th, but we discussed her possibly rescheduling since she has not had therapy yet.     Plan: I will see Ms. Rothman in 12/01/2023 to work on education, modification, and carryover of therapeutic activities to more complex activities.    TOTAL SERVICE TIME: 5 minutes  NO CHARGE FACILITY " JENNIFER Manuel M.A., CCC-SLP  Speech-Language Pathologist  [facility]  [email or web address]  She/Her/Hers     Speech recognition software may have been used in this documentation; input is reviewed before signature to the best of my ability.       Again, thank you for allowing me to participate in the care of your patient.      Sincerely,    Dorie Manuel, SLP

## 2023-11-20 ENCOUNTER — PATIENT OUTREACH (OUTPATIENT)
Dept: OTOLARYNGOLOGY | Facility: CLINIC | Age: 33
End: 2023-11-20
Payer: COMMERCIAL

## 2023-11-20 NOTE — PROGRESS NOTES
Called patient to let her know that provider would like to see her after therapy, so we needed to reschedule her appointment on 11/27. Asked patient to call back and provided direct number. Kasandra England RN on 11/20/2023 at 9:04 AM

## 2023-11-20 NOTE — PROGRESS NOTES
Patient called back to reschedule follow up appointment after therapy. Patient was agreeable to proposed date and time. Kasandra England RN on 11/20/2023 at 11:53 AM

## 2023-12-01 ENCOUNTER — VIRTUAL VISIT (OUTPATIENT)
Dept: OTOLARYNGOLOGY | Facility: CLINIC | Age: 33
End: 2023-12-01
Payer: COMMERCIAL

## 2023-12-01 DIAGNOSIS — J38.7 LARYNGEAL HYPERFUNCTION: ICD-10-CM

## 2023-12-01 DIAGNOSIS — R09.89 CHRONIC THROAT CLEARING: Primary | ICD-10-CM

## 2023-12-01 DIAGNOSIS — J38.3 PARADOXICAL VOCAL FOLD MOVEMENT: ICD-10-CM

## 2023-12-01 DIAGNOSIS — J38.7 IRRITABLE LARYNX SYNDROME: ICD-10-CM

## 2023-12-01 PROCEDURE — 92507 TX SP LANG VOICE COMM INDIV: CPT | Mod: GN | Performed by: SPEECH-LANGUAGE PATHOLOGIST

## 2023-12-01 NOTE — PATIENT INSTRUCTIONS
Yash Ortiz,    Great work today. Here is everything we discussed.    Dorie Manuel M.A., CCC-SLP  Speech-Language Pathologist  [facility]  [email or web address]  She/Her/Hers         Irritable Larynx Syndrome    What is Irritable Larynx Syndrome?  Irritable Larynx Syndrome (ILS) is a cluster of symptoms not associated with a specific disease process. Individuals with ILS can have any combination of the following complaints:      Globus sensation (feeling of lump or some other sensation in the throat)  Throat irritation or burning sensation  Tightness of throat or neck  Chronic cough or throat clearing; sensation of need to clear throat  Effort or pain with swallowing  Paradoxical vocal fold motion (sensation of difficulty inhaling)  Laryngospasm (tightening of throat causing choking or difficulty inhaling)    What causes ILS?  ILS works in the same way as a mosquito bite: We might scratch the bite absentmindedly a couple of times, and suddenly we realize the bite really itches!  So we scratch it vigorously because that feels better for a few moments. In the long run though, scratching actually makes the itch worse.  In the same way, when individuals experience mild irritation in their throats for some reason, they might not realize that they've begun  scratching  the  itch,  (throat clearing) but over time the irritation worsens and becomes more noticeable.  This is how earlier, milder symptoms can be the precursors to more-severe symptoms. Chronic irritation of the mucosa in the laryngeal area can cause changes to the nerve pathways; they can become hyper-excitable, so that it only takes a small irritation to have a large sensory response (like a cough).  It's nice that the nervous system can learn, but in this case it has backfired!    Here are some possible irritants that can start the chain reaction (most individuals have more than one):  Upper respiratory infection with cough  Acid  Reflux  Post Nasal Drainage  Allergens (e.g. tree, mold, pollen, pet dander)  Cigarette smoke or other kinds of smoke  Odors (e.g. perfume, hairspray)  Food sensitivities  Strong Emotions (e.g. anxiety, stress)  Hyperfunction of the muscles of the vocal mechanism  Harsh chemicals/  Cold Air    Evaluation of ILS  At the [facility], an otolaryngologist and a speech-language pathologist work as a team to determine if ILS is a problem.  Medical evaluation and laryngeal examination rule out any other cause for the symptoms.  Some medical treatment may be advised.  Functional evaluation determines whether there are behavioral or lifestyle factors that are contributing to the symptoms.      Treatment of ILS  Treatment of ILS addresses the cause of irritation. This can include:   Treatment of Acid Reflux This may include: Medications (what your MD prescribes), Dietary Precautions (what you eat and what supplements you take), Lifestyle Precautions (when you eat, avoiding environmental irritants), and Mechanical Precautions (how much pressure you put on your lower esophageal sphincter).  Functional Speech Therapy with a Speech-Language Pathologist (SLP). Your SLP will educate you about the disorder, help you improve the environment of your mucosa to reduce irritation, improve the movement and function of your larynx, and help reduce muscle tension and restore muscle balance.  Further Medical treatment is sometimes used to restore the medical basis for normal function and sensation.  Your MD will discuss these possibilities with you if they are relevant.      Cough and Throat Clearing  The biological purpose of the larynx is to protect the airway (trachea and lungs). Coughing and throat clearing are mechanisms that are meant to assist in the protection of our airway. When we feel something such as liquid, food, saliva, dust, etc. near our vocal folds, we will clear our throats and cough as a protective reflex.  We also cough or throat clear if our airway is irritated.     Why can chronic coughing and throat clearing be harmful?  A cough is produced by squeezing the vocal folds together, building up pressure in the lungs, and then quickly forcing the vocal folds open to clear away whatever might be getting too close to our airway. This can be traumatic to the vocal folds, irritating them in the same way that our hands would be irritated if they were being slapped together over and over. Coughing for a long period of time can cause the mucosa of the larynx and vocal folds to become hypersensitive, making it feel like something is threatening the airway.  The vocal folds need to cough or throat clear even when there isn't an actual physical threat to the airway.  The chronic coughing or throat clearing results in even more coughing or throat clearing.      Strategies to Reduce Irritation  Your speech-language pathologist will teach you techniques to use muscles optimally, in order to reduce irritation.  In the meantime, you can start reducing the irritation, using the following strategies:    PREVENTATIVE WAYS TO REDUCE COUGH AND THROAT CLEARING:  Identifying your unique trigger; take steps to avoid it  Improve both systemic and topical (surface) hydration (dry mucosa is more easily irritated)  Drink adequate fluids   Steaming  Personal humidifiers, nebulizers, of facial steamers,   Take warm compress (wet warm washcloth) and place on your face and inhale the steam  Leave shower running once you've stepped out; when you're brushing your teeth or getting ready in the bathroom  Boil hot water and breathe in the steam.  Use a humidifier, especially in the bedroom at night  Sinus Rinse or Neti Pot  Isotonic saline (0.9%) nasal spray or gel (listed below)  Guaifenesin (e.g. Mucinex) to thin viscous secretions  Sucking on candy, or cough drops without menthol, or chew gum, to increase salivary stimulation  Stimulate oral cavity by  "eating fruits such as grapes, watermelon, apples, cantaloupe, etc.,     WHAT TO DO WHEN YOU FEEL THE TICKLE OR URGE TO COUGH:  Alternative Behaviors to coughing  Swallow hard  Stimulate your oral cavity:   Sip hot, cold, carbonated, or flavored water  Suck on ice chips  Bite your tongue or cheek (not too hard!)  Massage under your chin or neck  Gargle: with warm water, regular water or with warm water with saline that you spit out. You can do this after brushing your teeth, after meals, or any time you feel the urge to throat clear.   Gentle hums or vocal exercises taught to you by your SLP  Say  eh eh eh eh  silently (for a gentle, non-irritating throat-clear)    WHAT TO DO WHEN YOU CAN'T STOP COUGHING:   Inhalation   Do 3 quick puppy sniffs in through the nose  Inhale slowly through the nose like you are smelling cookies  Inhale slowly through rounded lips like you are silently slurping a spaghetti noodle or a thin straw  Inhale slowly through a thin straw (Guillory's straw or smaller)  Place the tip of your tongue at the roof of your mouth and inhale. You will feel the air rush by the sides of your tongue.   Exhalation  Exhale on a \"Sh\" like you are shushing bad kids  Exhale like you are blowing candles on a cake.          Rescue Breathing Strategies: Do these as needed when you feel short of breath  Inhalation   Do 3 quick puppy sniffs in through the nose  Inhale slowly through the nose like you are smelling cookies  Inhale slowly through rounded lips like you are silently slurping a spaghetti noodle or a thin straw  Inhale slowly through a thin straw (Guillory's straw or smaller)  Place the tip of your tongue at the roof of your mouth and inhale. You will feel the air rush by the sides of your tongue.   Exhalation  Exhale on a \"Sh\" like you are shushing bad kids  Exhale like you are blowing candles on a cake.       Respiratory Pacing Techniques with Exercise  When running, inhale for 4 counts and exhale for 4, " "6, or 8 counts. You can inhale through rounded lips and exhale on \"sh\" or like you are blowing out candles  When biking, inhale for 4 counts and exhale for 4, 6, or 8 counts. You can inhale through rounded lips and exhale on \"sh\" or like you are blowing out candles  When lifting weights, inhale on the preparation and exhale when lifting or pushing the weight.   When performing crunches, inhale while laying flat on the floor and exhale on the crunch.      BELLY BREATHING (3 breaths per hour, until habit)  Cross you arms and place your hands on either side of your ribs  Inhale with rounded lips (silently slurping spaghetti noodle) and exhale like you are blowing out candles.   Slowly breathe in and feel your belly and ribcage expand, like filling a balloon, pushing out against your waistband  Slowly breathe out and feel your belly and ribcage crunch inward, like zipping tight pants  Repeat slowly and take breaks if you get dizzy  HELPFUL HINTS:  -- Some people find it easiest to practice while laying on their back or lean back in chair with one hand on their belly and one hand on their chest.     SEMI-OCCLUDED VOCAL TRACT EXERCISES: 5-7x a day for 2-3 minutes. Use a fat straw with a couple of inches of water in a cup.    If you don't have straw or cup you can do lip trills, Buzzy Z (like a bee), elephant (puff air in your upper lip), or humming (smile or yawn posture).  You can also use the straw alone.     Cup and Bubble Exercises   WHY: Though these exercises seems (and feels) silly they are helpful for a number of reasons. First, they make you use your air generously and consistently, helping you to coordinate your breath and your voice. Second, they lengthen and narrow the vocal tract with the straw. This narrowing (or semi-occlusion in scientific terms) creates back pressure in your throat which has been shown to help the vocal folds vibrate more easily and reduce how hard some of the other muscles are squeezing. " "  HOW:   With Water - Fill the cup (or bottle) about 2 inches full of water. Blow bubbles through the straw while keeping your voice on. (kind of like making an \"ooooo\" sound through straw).Keep the water bubbling the whole time. That means your air is moving consistently.   Without Water - make sound through the straw (like it's a kazoo). Make sure you are using your air freely. You can hold your hand in front of the straw to test, and you should be able to feel the air moving.   Feel how open and relaxed your throat feels when you practice these sounds. If the bubbling or air stops or it gets tight, don't sweat it. Just breath, relax, and start again. Across all the exercises make sure you are getting a nice low breath and feeling the steady inward motion of low abdominal muscles when making sound.   Practice 5 times a day for no more than 3 minutes, and whenever you feel fatigued.   Aren't sure if you are doing it right? Ask yourself these three questions:   1. Does it feel easy?   2. Are the bubbles and voice consistent?   3. Do you feel that forward buzz?     What sounds to make:   Single pitches - use any comfortable pitch and sustain the note for as long as it feels free and easy, and your lips or tongue are bubbling.   Sighs - glide from high to low like you are sitting down in a comfortable chair after a long day of work   [place] - Start on a medium pitch and glide up just a bit and back down        "

## 2023-12-01 NOTE — PROGRESS NOTES
Diana Rothman is a 33 year old female who is being evaluated via a billable video visit.      Diana has been notified and verbally consented to the following:   This video visit will be conducted between you and your provider.  Patient has opted to conduct today's video visit vs an in-person appointment.   Video visits are billed at different rates depending on your insurance coverage. Please reach out to your insurance provider with any questions.   If during the course of the call the provider feels the appointment is not appropriate, you will not be charged for this service.  Provider has received verbal consent for billable virtual visit from the patient? Yes  Will anyone else be joining your video visit? No    Call initiated at: 10:00 am   Type of Visit Platform Used: [employer] Video  Location of provider: Home  Location of patient: [facility]  Luis Mcneil Jr., M.D., F.A.C.S.  Annie Rodriguez M.D., M.P.H.  Khalida Fairchild M.D.  Dora Prince M.M. (voice), M.A., CCC-SLP  Dorie Manuel M.A., CCC-SLP  Azeb Olvera, Ph.D., CCC-SLP  Himanshu Ramesh, Ph.D., CCC-SLP  Rubia Perkins M.S., CCC-SLP  Quintin Causey M.M., M.A., CCC-SLP  ABIOLA Oliveira (voice), M.S., CCC-SLP    [facility]  VOICE/SPEECH/BREATHING THERAPY PROGRESS REPORT    Patient: Diana Rothman  Date of Service: 12/1/2023    Date of Last Service: 07/03/2023  Referring physician: Dr. Rodriguez  Impressions from the initial evaluation on 07/03/2023: This evaluation has resulted in the following diagnosis/diagnoses for Ms. 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)    Laryngeal evaluation demonstrated white granulation observed by the left vocal fold process at the left medial arytenoid wall; very slight varix observed at the left vocal fold near the medial edge  "(mid fold area); Narrow Band Imaging yielded the following: apparent white granulation observed by the left vocal fold process at the left medial arytenoid wall and mild erythema observed bilaterally on the vocal folds L>R; erythema of the medial arytenoid walls; with simulated heavy breathing, anterior rocking of arytenoids was seen, however, the vocal folds remained opened; she was responsive to the rounded lip inhale, puppy sniffs, and exhaling on \"Sh\" and like blowing out candles; slight droopiness observed of the right arytenoid at rest; variable mild-moderate four-way constriction of the supraglottic larynx during connected speech  Perceptual evaluation demonstrated frequent vocal arana.    I had the pleasure of seeing Ms. 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 Rothman worked on therapeutic activities to address the above diagnosis.    Ms. Rothman was seen for evaluation on 07/03/2023.  At that time, it was determined that  she would benefit from a course of speech therapy to address the above diagnosis.    Regarding practice, Ms. Rothman reports the following:   She has done the strategies but she doesn't always remember to do them. The strategies have helped \"some.\"    Ms. Rothman also states that:  90% of the time her throat clearing is  \"triggered by mucus\" and 10% it is not triggered by mucus.  When she pushes herself in workouts, she feel that she can't breathe.     Ms. Rothman presents today with the following:  Voice quality:  Variable mild-moderate vocal arana  Intermittent mild strain  Cough/ Throat clear:  Not observed      THERAPEUTIC ACTIVITIES  Today Ms. Rothman participated in the following therapeutic activities:  Asked many " "questions about the nature of her symptoms, and I answered all of these thoroughly.  [place] concepts and techniques for using saline and plain-water gargling nasal irrigation steam guaifenesin to reduce the thickened secretions / laryngeal irritation.  [place] concepts and techniques for improving topical and systemic hydration   I provided instruction for techniques and strategies to reduce the chronic cough/throat clearing  alternative behaviors such as voiceless glottic coup, humming, swallowing, etc. were taught  [place] techniques for optimal breathing for when she feels short of breath during exercise, but also with speech.   Rescue breathing instructions were provided  Breathing mechanics instructions were provided (crossed arms with her hands on the side of her ribcage)  [place] exercises to add phonation to the optimal flowing airstream.  Semi-occluded vocal tract exercises with a straw (\"cup and bubbles\"), straw phonation, lip trills, Buzzy Z, trumpet, and humming (smile and yawn posture) instructions were provided.  Lips trills were the most facilitating  Instructed to use as a voice warm up, cool down, coordination of breath flow with phonation, as a voice reset frequently throughout the day,  good learning, but will need practice   [place] concepts of an optimal regimen for practice.  she should use an interval schedule of practice, with brief periods of practice frequently throughout each day  I provided an after visit summary to help facilitate practice.      IMPRESSIONS/GOALS/PLAN  Ms. 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     She will continue to work on her exercises on a daily basis, and work on incorporating the techniques into her daily activities.    Progress toward long-term goals:   Minimal at this point, as this is first session, but good learning today    Goals for this practice period:   practice all exercises according to instructions    Plan: I will see Ms. Rothman on 12/15/2023 to work on education, modification, and carryover of therapeutic activities to more complex activities.      TOTAL SERVICE TIME: 45 minutes  TREATMENT (27512)  NO CHARGE FACILITY FEE    Dorie Manuel M.A., CCC-SLP  Speech-Language Pathologist  [facility]  [email or web address]  She/Her/Hers     Speech recognition software may have been used in this documentation; input is reviewed before signature to the best of my ability.

## 2023-12-01 NOTE — LETTER
12/1/2023       RE: Diana Rothman  [address]  [place] [address]     Dear Colleague,    Thank you for referring your patient, Diana Rothman, to the [facility] VOICE [facility]INIC [facility]. Please see a copy of my visit note below.    Diana Rothman is a 33 year old female who is being evaluated via a billable video visit.      Diana has been notified and verbally consented to the following:   This video visit will be conducted between you and your provider.  Patient has opted to conduct today's video visit vs an in-person appointment.   Video visits are billed at different rates depending on your insurance coverage. Please reach out to your insurance provider with any questions.   If during the course of the call the provider feels the appointment is not appropriate, you will not be charged for this service.  Provider has received verbal consent for billable virtual visit from the patient? Yes  Will anyone else be joining your video visit? No    Call initiated at: 10:00 am   Type of Visit Platform Used: [employer]  Location of provider: Home  Location of patient: [facility] VOICE [facility]  Luis Mcneil Jr., M.D., F.A.C.S.  Annie Rodriguez M.D., M.P.H.  Khalida Fairchild M.D.  Dora Prince M.M. (voice), M.A., CCC-SLP  Dorie Manuel M.A., CCC-SLP  Azeb Olvera, Ph.D., CCC-SLP  Himanshu Ramesh, Ph.D., CCC-SLP  Rubia Perkins M.S., CCC-SLP  Quintin Causey M.M., M.A., CCC-SLP  ABIOLA Oliveira (voice), M.S., CCC-SLP    [facility]  VOICE/SPEECH/BREATHING THERAPY PROGRESS REPORT    Patient: Diana Rothman  Date of Service: 12/1/2023    Date of Last Service: 07/03/2023  Referring physician: Dr. Rodriguez  Impressions from the initial evaluation on 07/03/2023: This evaluation has resulted in the following diagnosis/diagnoses for Ms. 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)    Laryngeal evaluation demonstrated white granulation observed by the left vocal fold process at the left medial arytenoid wall; very slight varix observed at the left vocal fold near the medial edge (mid fold area); Narrow Band Imaging yielded the following: apparent white granulation observed by the left vocal fold process at the left medial arytenoid wall and mild erythema observed bilaterally on the vocal folds L>R; erythema of the medial arytenoid walls; with simulated heavy breathing, anterior rocking of arytenoids was seen, however, the vocal folds remained opened; she was responsive to the rounded lip inhale, puppy sniffs, and exhaling on \"Sh\" and like blowing out candles; slight droopiness observed of the right arytenoid at rest; variable mild-moderate four-way constriction of the supraglottic larynx during connected speech  Perceptual evaluation demonstrated frequent vocal arana.    I had the pleasure of seeing Ms. 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 Rothman worked on therapeutic activities to address the above diagnosis.    Ms. Rothman was seen for evaluation on 07/03/2023.  At that time, it was determined that  she would benefit from a course of speech therapy to address the above diagnosis.    Regarding practice, Ms. Rothman reports the following:   She has done the strategies but she doesn't always remember to do them. The strategies have helped \"some.\"    Ms. Rothman also states that:  90% of the time her throat clearing is  \"triggered by mucus\" and 10% it is not triggered by mucus.  When she pushes herself in " "workouts, she feel that she can't breathe.     Ms. Rothman presents today with the following:  Voice quality:  Variable mild-moderate vocal arana  Intermittent mild strain  Cough/ Throat clear:  Not observed      THERAPEUTIC ACTIVITIES  Today Ms. Rothman participated in the following therapeutic activities:  Asked many questions about the nature of her symptoms, and I answered all of these thoroughly.  Hamel concepts and techniques for using saline and plain-water gargling nasal irrigation steam guaifenesin to reduce the thickened secretions / laryngeal irritation.  Hamel concepts and techniques for improving topical and systemic hydration   I provided instruction for techniques and strategies to reduce the chronic cough/throat clearing  alternative behaviors such as voiceless glottic coup, humming, swallowing, etc. were taught  Hamel techniques for optimal breathing for when she feels short of breath during exercise, but also with speech.   Rescue breathing instructions were provided  Breathing mechanics instructions were provided (crossed arms with her hands on the side of her ribcage)  Hamel exercises to add phonation to the optimal flowing airstream.  Semi-occluded vocal tract exercises with a straw (\"cup and bubbles\"), straw phonation, lip trills, Buzzy Z, trumpet, and humming (smile and yawn posture) instructions were provided.  Lips trills were the most facilitating  Instructed to use as a voice warm up, cool down, coordination of breath flow with phonation, as a voice reset frequently throughout the day,  good learning, but will need practice   Hamel concepts of an optimal regimen for practice.  she should use an interval schedule of practice, with brief periods of practice frequently throughout each day  I provided an after visit summary to help facilitate practice.      IMPRESSIONS/GOALS/PLAN  Ms. 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     She will continue to work on her exercises on a daily basis, and work on incorporating the techniques into her daily activities.    Progress toward long-term goals:   Minimal at this point, as this is first session, but good learning today    Goals for this practice period:   practice all exercises according to instructions    Plan: I will see Ms. Rothman on 12/15/2023 to work on education, modification, and carryover of therapeutic activities to more complex activities.      TOTAL SERVICE TIME: 45 minutes  TREATMENT (37257)  NO CHARGE FACILITY FEE    Dorie Manuel M.A., CCC-SLP  Speech-Language Pathologist  [facility]  [email or web address]  She/Her/Hers     Speech recognition software may have been used in this documentation; input is reviewed before signature to the best of my ability.       Again, thank you for allowing me to participate in the care of your patient.      Sincerely,    Dorie Manuel, SLP

## 2023-12-08 ENCOUNTER — MYC MEDICAL ADVICE (OUTPATIENT)
Dept: OTOLARYNGOLOGY | Facility: CLINIC | Age: 33
End: 2023-12-08
Payer: COMMERCIAL

## 2023-12-15 ENCOUNTER — VIRTUAL VISIT (OUTPATIENT)
Dept: OTOLARYNGOLOGY | Facility: CLINIC | Age: 33
End: 2023-12-15
Payer: COMMERCIAL

## 2023-12-15 DIAGNOSIS — J38.7 IRRITABLE LARYNX SYNDROME: ICD-10-CM

## 2023-12-15 DIAGNOSIS — R09.89 CHRONIC THROAT CLEARING: ICD-10-CM

## 2023-12-15 DIAGNOSIS — J38.3 PARADOXICAL VOCAL FOLD MOVEMENT: ICD-10-CM

## 2023-12-15 DIAGNOSIS — J38.7 LARYNGEAL HYPERFUNCTION: Primary | ICD-10-CM

## 2023-12-15 PROCEDURE — 92507 TX SP LANG VOICE COMM INDIV: CPT | Mod: GN | Performed by: SPEECH-LANGUAGE PATHOLOGIST

## 2023-12-15 NOTE — PATIENT INSTRUCTIONS
"Yash Ortiz,    Great work today. Here is everything we discussed.    Dorie Manuel M.A., CCC-SLP  Speech-Language Pathologist  [facility]  [email or web address]  She/Her/Hers     Laryngeal and Face Massage  Start at your temples massage them in circles. You can make your way down to your cheeks and temporomandibular joint.  Then you massage under your jaw using your thumbs to dig (not too hard) and massage. Start from the sides of your jaw and make your way towards the front.    Tongue Base Massage            1.Using the knuckle of your index finger or thumb, begin massaging in small, gentle circles right under the chin.  You can also hold your chin with your index finger and use your thumb.  2. Work along the sides, middle, and tip of the bottom of the chin with a gentle pressure.    3. It is important to keep the chin level or down to avoid putting strain on the neck muscles.    Cheek Massage and Compression:  Using your thumbs, massage under your cheek bones.  Once your reached the highest point of your cheek bones (you will your gums and molars under the highest point), you will press your thumbs inward and upward.  Hold for 30 seconds  You may feel pressure in your sinuses.  Don't forget to breathe and let your jaw relax.    ThyroHyoid Space Massage- circles and then pull larynx down  1. Gently place a thumb and pointer finger on each side of your willie's apple, finding the slight indented groove along the top of each side.   2. Move your fingers in a front to back motion, massaging gently within the groove.   3. Then move your fingers in tiny circular motions, up and back, down and forward, repeat.  4. Inhale slowly with a yawny sensation, gently pulling downward along your willie's apple (Don't pinch hard, just enough to \"suggest\" a downward movement).   5.  Reset your fingers at the top of your larynx and repeat several times.   6.  Always be gentle- don't use enough pressure that you would " bruise a peach or banana if you were holding one between your fingers.     See Saw Stretch  1. Gently place a thumb and pointer finger on each side of your willie's apple, finding the slight indented groove along the top of each side.   2. Move your thumb upward on one side while the pointer moves down on the other side.   3. Slowly switch your fingers back and forth, as if you were turning a radio dial up and down.  4. Always be gentle- don't use enough pressure that you would bruise a peach or banana if you were holding one between your fingers.     Lateral Laryngeal Stretch:  1. Position your hands like praying, then rotate both sets of fingers toward yourself and place them along each side of your willie's apple.   2. Gentle shift your willie's apple side-to-side, only about a centimeter in each direction.   3. Decide which direction feels easier.   4. Shift your willie's apple about a centimeter in the easier direction and hold, breathing slow, deep breaths for about 120 seconds.   5. Shift the other direction and hold, breathing slow, deep breaths for about 120 seconds.     Sternocleidomastoid Massage  1.Starting directly below the ears, use your hands to massage in circles. Work your way down the sides of the neck, and into the shoulders. Work back up the side of the neck, and then work down the front of the neck.    2. You may use slightly more pressure for this massage, but stop if there is pain / throbbing.  3. Try turning your head to be better able to find these sternocleidomastoid muscles.  4. Use your hands to pull down along these muscles from below the ear to the shoulders, and from below the ear in a V shape towards the front.  5. Use your fingers to gently massage the  little twiggies  (a.k.a.: sternal origin of the Sternocleidomastoid Muscle that attaches to the medial end of the clavicle/collarbone) one at a time.                   For the semi-occluded vocal tract exercises, if anything feels effortful  or worse after doing them, do not do them anymore. The goal is to feel a forward buzz at your nose, lips, teeth, tongue, etc and no effort pushing from the throat. When they are done correctly, they help warm up, reset, or cool down your voice throughout the day.       Spacious Speech Phrases  Frequency of practice: 2-3 times a day or any time you yawn or sigh.     Start with an open-throated breath (like with a sniff of something wonderful or the beginning of a yawn or a surprise breath).  Let the breath do the work.  Cross your arms and inhale through rounded lips. Phonate on the exhale like you are yawning.   Be light, breathy, spacious. Linger in the H sound if necessary.   Exaggerate inflection!  Glide over your entire pitch range.  These should feel effortless in your throat.  This is like a massage for your vocal folds, stretching and fartun the muscles, while vibrating in a spacious throat.  Think of using this during conversation as a way to get out of the vocal arana.     Jose Luiseeeeeeee   Haaaaaaaay   Aniketoooooooh   Huuuuuuuu   Haaaaaaaah     Hi there   How are you?   Who are you?   Who is she?   Who is he?   Who are they?   Hello, hello, hello   How's it going?

## 2023-12-15 NOTE — LETTER
12/15/2023       RE: Diana Rothman  [address]  [place] [address]     Dear Colleague,    Thank you for referring your patient, Diana Rothman, to the [facility] VOICE CLINIC [facility]. Please see a copy of my visit note below.    Diana Rothman is a 33 year old female who is being evaluated via a billable video visit.      Diana has been notified and verbally consented to the following:   This video visit will be conducted between you and your provider.  Patient has opted to conduct today's video visit vs an in-person appointment.   Video visits are billed at different rates depending on your insurance coverage. Please reach out to your insurance provider with any questions.   If during the course of the call the provider feels the appointment is not appropriate, you will not be charged for this service.  Provider has received verbal consent for billable virtual visit from the patient? Yes  Will anyone else be joining your video visit? No    Call initiated at: 09:00 am   Type of Visit Platform Used: [employer]  Location of provider: Home  Location of patient: [facility] VOICE [facility]  Luis Mcneil Jr., M.D., F.A.C.S.  Annie Rodriguez M.D., M.P.H.  Khalida Fairchild M.D.  Dora Prince M.M. (voice), M.A., CCC-SLP  Dorie Manuel M.A., CCC-SLP  Azeb Olvera, Ph.D., CCC-SLP  Himanshu Ramesh, Ph.D., CCC-SLP  Rubia Perkins M.S., CCC-SLP  Quintin Causey M.M., M.A., CCC-SLP  ABIOLA Oliveira (voice), M.S., CCC-SLP    [facility]  VOICE/SPEECH/BREATHING THERAPY PROGRESS REPORT    Patient: Diana Rothman  Date of Service: 12/15/2023    Date of Last Service: 12/01/2023  Referring physician: Dr. Rodriguez  Impressions from the initial evaluation on 07/03/2023: This evaluation has resulted in the following diagnosis/diagnoses for Ms. 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)    Laryngeal evaluation demonstrated white granulation observed by the left vocal fold process at the left medial arytenoid wall; very slight varix observed at the left vocal fold near the medial edge (mid fold area); Narrow Band Imaging yielded the following: apparent white granulation observed by the left vocal fold process at the left medial arytenoid wall and mild erythema observed bilaterally on the vocal folds L>R; erythema of the medial arytenoid walls; with simulated heavy breathing, anterior rocking of arytenoids was seen, however, the vocal folds remained opened; she was responsive to the rounded lip inhale, puppy sniffs, and exhaling on \"Sh\" and like blowing out candles; slight droopiness observed of the right arytenoid at rest; variable mild-moderate four-way constriction of the supraglottic larynx during connected speech  Perceptual evaluation demonstrated frequent vocal arana.     I had the pleasure of seeing Ms. 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 Rothman worked on therapeutic activities to address the above diagnosis.    Regarding practice, Ms. Rothman reports the following:   She tried to the the lip trills after we last met. This fatigued her voice after doing this, so she stopped.  She bought a straw and she feels that it helps. She keeps the straw in the car.   She did the rescue breathing after practicing the lip trills. She felt \"something closing up\" and it helped to do the rescue breathing.     Ms. Rothman also states that:  Her throat is hurting and it feels like a lump in her throat more often.   She feels " "her throat clearing has improved because she is more aware of it. She may cough on accident but she feels like she has more control over this.     Ms. Rothman presents today with the following:  Voice quality:  Mild roughness with frequent vocal arana.   Cough/ Throat clear:  Not observed.     THERAPEUTIC ACTIVITIES  Today Ms. Rothman participated in the following therapeutic activities:  Asked many questions about the nature of her symptoms, and I answered all of these thoroughly.  [place] massage techniques the tender areas of her neck and face, in order to reduce tension.  Reviewed exercises semi-occluded vocal tract exercises with a straw (\"cup and bubbles\"), straw phonation, lip trills, Buzzy Z, trumpet, and humming (smile and yawn posture).  We discussed what feels easy to her to practice.   [place] exercises for improved airflow during phonation.  speech material with aspirate onsets was facilitating  Incorporated breathing techniques to promote motor learning of lower breathing (ribcage, abdomen, diaphragm).  Instructed at the word and phrase level.  learned techniques to reduce glottal arana and improve breath flow  I provided an after visit summary to help facilitate practice.      IMPRESSIONS/GOALS/PLAN  Ms. 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     She will continue to work on her exercises on a daily basis, and work on incorporating the techniques into her daily activities.    Progress toward long-term goals:   Adequate progress; please see above    Goals for this practice period:   practice all exercises according to " instructions  incorporate techniques into daily vocal activities  maintain vigilance for vocal technique    Plan: I will see Ms. Rothman on 12/21/2023  to work on education, modification, and carryover of therapeutic activities to more complex activities.    TOTAL SERVICE TIME: 60 minutes  TREATMENT (95626)  NO CHARGE FACILITY FEE    Dorie Manuel M.A., CCC-SLP  Speech-Language Pathologist  [facility]  [email or web address]  She/Her/Hers     Speech recognition software may have been used in this documentation; input is reviewed before signature to the best of my ability.       Again, thank you for allowing me to participate in the care of your patient.      Sincerely,    Dorie Manuel, SLP

## 2023-12-15 NOTE — PROGRESS NOTES
Diana Rothman is a 33 year old female who is being evaluated via a billable video visit.      Diana has been notified and verbally consented to the following:   This video visit will be conducted between you and your provider.  Patient has opted to conduct today's video visit vs an in-person appointment.   Video visits are billed at different rates depending on your insurance coverage. Please reach out to your insurance provider with any questions.   If during the course of the call the provider feels the appointment is not appropriate, you will not be charged for this service.  Provider has received verbal consent for billable virtual visit from the patient? Yes  Will anyone else be joining your video visit? No    Call initiated at: 09:00 am   Type of Visit Platform Used: [employer]ar[employer] Video  Location of provider: Home  Location of patient: [facility]  Luis Mcneil Jr., M.D., F.A.C.S.  Annie Rodriguez M.D., M.P.H.  Khalida Fairchild M.D.  Dora Prince M.M. (voice), M.A., CCC-SLP  Dorie Manuel M.A., CCC-SLP  Azeb Olvera, Ph.D., CCC-SLP  Himanshu Ramesh, Ph.D., CCC-SLP  Rubia Perkins M.S., CCC-SLP  Quintin Causey M.M., M.A., CCC-SLP  ABIOLA Oliveira (voice), M.S., CCC-SLP    [facility]  VOICE/SPEECH/BREATHING THERAPY PROGRESS REPORT    Patient: Diana Rothman  Date of Service: 12/15/2023    Date of Last Service: 12/01/2023  Referring physician: Dr. Rodriguez  Impressions from the initial evaluation on 07/03/2023: This evaluation has resulted in the following diagnosis/diagnoses for Ms. 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)    Laryngeal evaluation demonstrated white granulation observed by the left vocal fold process at the left medial arytenoid wall; very slight varix observed at the left vocal fold near the medial edge  "(mid fold area); Narrow Band Imaging yielded the following: apparent white granulation observed by the left vocal fold process at the left medial arytenoid wall and mild erythema observed bilaterally on the vocal folds L>R; erythema of the medial arytenoid walls; with simulated heavy breathing, anterior rocking of arytenoids was seen, however, the vocal folds remained opened; she was responsive to the rounded lip inhale, puppy sniffs, and exhaling on \"Sh\" and like blowing out candles; slight droopiness observed of the right arytenoid at rest; variable mild-moderate four-way constriction of the supraglottic larynx during connected speech  Perceptual evaluation demonstrated frequent vocal arana.     I had the pleasure of seeing Ms. 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 Rothman worked on therapeutic activities to address the above diagnosis.    Regarding practice, Ms. Rothman reports the following:   She tried to the the lip trills after we last met. This fatigued her voice after doing this, so she stopped.  She bought a straw and she feels that it helps. She keeps the straw in the car.   She did the rescue breathing after practicing the lip trills. She felt \"something closing up\" and it helped to do the rescue breathing.     Ms. Rothman also states that:  Her throat is hurting and it feels like a lump in her throat more often.   She feels her throat clearing has improved because she is more aware of it. She may cough on accident but she feels like she has more control over this.     Ms. Rothman presents today with the following:  Voice quality:  Mild roughness with frequent vocal arana.   Cough/ Throat clear:  Not observed.     THERAPEUTIC " "ACTIVITIES  Today Ms. Rothman participated in the following therapeutic activities:  Asked many questions about the nature of her symptoms, and I answered all of these thoroughly.  [place] massage techniques the tender areas of her neck and face, in order to reduce tension.  Reviewed exercises semi-occluded vocal tract exercises with a straw (\"cup and bubbles\"), straw phonation, lip trills, Buzzy Z, trumpet, and humming (smile and yawn posture).  We discussed what feels easy to her to practice.   [place] exercises for improved airflow during phonation.  speech material with aspirate onsets was facilitating  Incorporated breathing techniques to promote motor learning of lower breathing (ribcage, abdomen, diaphragm).  Instructed at the word and phrase level.  learned techniques to reduce glottal arana and improve breath flow  I provided an after visit summary to help facilitate practice.      IMPRESSIONS/GOALS/PLAN  Ms. 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     She will continue to work on her exercises on a daily basis, and work on incorporating the techniques into her daily activities.    Progress toward long-term goals:   Adequate progress; please see above    Goals for this practice period:   practice all exercises according to instructions  incorporate techniques into daily vocal activities  maintain vigilance for vocal technique    Plan: I will see Ms. Rothman on 12/21/2023  to work on education, modification, and carryover of therapeutic activities to more complex activities.    TOTAL SERVICE TIME: 60 minutes  TREATMENT (62448)  NO " CHARGE FACILITY FEE    Dorie Manuel M.A., CCC-SLP  Speech-Language Pathologist  [facility]  [email or web address]  She/Her/Hers     Speech recognition software may have been used in this documentation; input is reviewed before signature to the best of my ability.

## 2023-12-21 ENCOUNTER — VIRTUAL VISIT (OUTPATIENT)
Dept: OTOLARYNGOLOGY | Facility: CLINIC | Age: 33
End: 2023-12-21
Payer: COMMERCIAL

## 2023-12-21 DIAGNOSIS — R09.89 CHRONIC THROAT CLEARING: ICD-10-CM

## 2023-12-21 DIAGNOSIS — J38.3 PARADOXICAL VOCAL FOLD MOVEMENT: ICD-10-CM

## 2023-12-21 DIAGNOSIS — J38.7 IRRITABLE LARYNX SYNDROME: ICD-10-CM

## 2023-12-21 DIAGNOSIS — J38.7 LARYNGEAL HYPERFUNCTION: Primary | ICD-10-CM

## 2023-12-21 PROCEDURE — 92507 TX SP LANG VOICE COMM INDIV: CPT | Mod: GN | Performed by: SPEECH-LANGUAGE PATHOLOGIST

## 2023-12-21 NOTE — LETTER
12/21/2023       RE: Diana Rothman  [address]  [place] [address]     Dear Colleague,    Thank you for referring your patient, Diana Rothman, to the [facility]. Please see a copy of my visit note below.    Diana Rothman is a 33 year old female who is being evaluated via a billable video visit.      Diana has been notified and verbally consented to the following:   This video visit will be conducted between you and your provider.  Patient has opted to conduct today's video visit vs an in-person appointment.   Video visits are billed at different rates depending on your insurance coverage. Please reach out to your insurance provider with any questions.   If during the course of the call the provider feels the appointment is not appropriate, you will not be charged for this service.  Provider has received verbal consent for billable virtual visit from the patient? Yes  Will anyone else be joining your video visit? No    Call initiated at: 09:00 am   Type of Visit Platform Used: [place] REI  Location of provider: Home  Location of patient: [facility] VOICE [facility]  Luis Mcneil Jr., M.D., F.A.C.S.  Annie Rodriguez M.D., M.P.H.  Khalida Fairchild M.D.  Dora Prince M.M. (voice), M.A., CCC-SLP  Dorie Manuel M.A., CCC-SLP  Azeb Olvera, Ph.D., CCC-SLP  Himanshu Ramesh, Ph.D., CCC-SLP  Rubia Perkins M.S., CCC-SLP  Quintin Causey M.M., M.A., CCC-SLP  ABIOLA Oliveira (voice), M.S., CCC-SLP    [facility]  VOICE/SPEECH/BREATHING THERAPY PROGRESS REPORT    Patient: Diana Rothman  Date of Service: 12/21/2023    Date of Last Service: 12/15/2023  Referring physician: Dr. Rodriguez  Impressions from the initial evaluation on 07/03/2023: This evaluation has resulted in the following diagnosis/diagnoses for Ms. 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)    Laryngeal evaluation demonstrated white granulation observed by the left vocal fold process at the left medial arytenoid wall; very slight varix observed at the left vocal fold near the medial edge (mid fold area); Narrow Band Imaging yielded the following: apparent white granulation observed by the left vocal fold process at the left medial arytenoid wall and mild erythema observed bilaterally on the vocal folds L>R; erythema of the medial arytenoid walls; with simulated heavy breathing, anterior rocking of arytenoids was seen, however, the vocal folds remained opened; she was responsive to the rounded lip inhale, puppy sniffs, and exhaling on \"Sh\" and like blowing out candles; slight droopiness observed of the right arytenoid at rest; variable mild-moderate four-way constriction of the supraglottic larynx during connected speech  Perceptual evaluation demonstrated frequent vocal arana.     I had the pleasure of seeing Ms. 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 Rothman worked on therapeutic activities to address the above diagnosis.      Ms. Rothman also states that:  She is having more mucus and coughing/throat clearing, but she feels like she is getting sick.   She is feel a tickle more often than normal.   She tried not to throat clear recently but she couldn't even talk due to mucus, so she did the silent throat clear.   She is more aware of the lump in the throat feeling now.   We discussed that the ways she is speaking may be contributing to this and she agrees.     Ms. Rothman presents today with the following:    Voice " quality:  Frequent vocal arana  Intermittent mild-moderate roughness and strain  Cough/ Throat clear:  Not observed      THERAPEUTIC ACTIVITIES  Today Ms. Rothman participated in the following therapeutic activities:  Asked many questions about the nature of her symptoms, and I answered all of these thoroughly.  [place] exercises to experience a more forward sensation during phonation.  speech material with nasal continuants was facilitating  able to recognize improvement in quality and comfort  able to progress from syllable to sentence level  good learning, but will need practice  [place] exercises improve optimal breath flow and to reduce glottal arana   Instructed with reading a paragraph aloud with visual cues to breathe  We discussed her being aware of when she uses vocal arana and when she doesn't in conversation  I provided an after visit summary to help facilitate practice.      IMPRESSIONS/GOALS/PLAN  Ms. 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     She will continue to work on her exercises on a daily basis, and work on incorporating the techniques into her daily activities.    Progress toward long-term goals:   Adequate progress; please see above    Goals for this practice period:   practice all exercises according to instructions  incorporate techniques into daily vocal activities  maintain vigilance for vocal technique    Plan: I will see Ms. Rohtman on 01/04/2024 to work on education, modification, and carryover of therapeutic activities to more complex activities.    TOTAL SERVICE TIME: 40 minutes  TREATMENT (50000)  NO CHARGE  FACILITY FEE    Dorie Manuel M.A., CCC-SLP  Speech-Language Pathologist  [facility]  [email or web address]  She/Her/Hers     Speech recognition software may have been used in this documentation; input is reviewed before signature to the best of my ability.       Again, thank you for allowing me to participate in the care of your patient.      Sincerely,    Dorie Manuel, SLP

## 2023-12-21 NOTE — PATIENT INSTRUCTIONS
Yash Ortiz,    Great work today. Here is everything we discussed.    Dorie Manuel M.A., CCC-SLP  Speech-Language Pathologist  [facility]  [email or web address]  She/Her/Hers     Resonant Voice Therapy: the goal is to place the sound forward (nose, teeth, lips, tongue, etc.) and reduce tension in your throat. IF it helps, sing the phrase first on one pitch (like a robot) and then speak the phrase almost as if you are singing.            M & N- Sentences    Target: resonance/reduce laryngeal focus    Goal: to maintain use of resonance and reduce laryngeal focus during sentence production      Cue:  Let's try sentences using a resonant voice.  Remember to keep the vibration in your oral and nasal cavities - away from your throat.     M Sentences  Meet my mom.  Mix more milk.  Merge more movies.  Moths make a mess.  March for a mile.  Garry my mailbag.  Make maple malt.  Mean marketers mumble.  Munch on mushy melons.  March on [employer].  Meet the man at the [place].  Measure a million miles.  Many men make merry.  [place] mowers make a mess.  Mules must make mud pies.  Master movers never mess up.  [place]ad[place] made muffins in [place].  Metric machines mean more money.  Garry the message before I move.  Mom melted my magic mascot.  [place] made me more mashed meat.  Mommy made me mash my M&Ms.  Middle school menaces are mean.  [place] makes men's minds mushy.  Music makes me move my muscles.  Musicians' mouthpieces make music.  Monsters move in the moonbeams.  My monument to myself.  [place] meets my merry motives.   Morning memos must be read in the [employer].   messengers sent the memos to me.  My mouse and monkey wear mini mittens.  Members made mobiles with modern matchboxes,  Monarchs love aime mangos in the morning.  Mountain climbers are mystified by monumental peaks.  [place] of mud messed up the merging motorists.  Bender and mold are not manmade materials.  Markets in [place] collect money  from mountaineers.  Mysterious marble is magnified with a microscope.  Marathon runners move quickly on the last mile.  Marshmallows are mysteriously missed.  Miniature mice made a home in my mousetrap.  My muse makes many mess moonstones.  My mask must meet medical mandates.  Multiple millionaires manufacture machines with modern microchips.      N Sentences  No news is nice.  Now the night is known.  Luis Felipe knew his nephew too.  Jim knew nice necklaces.  Next and now a newness is near.  Monica never needed more news.  Nureyev knows no neck monsters.  Never need null new neighbors.  No yang never neglected nourishment.  Neat nurses napped next to nature.   New knees need knee socks.     M&N Sentences  Movie news makes money.  No neck monsters need more money.  Normal necks make noise.  Many nations need men.  Negligent neighbors make mockery of names.  New and many navy men.  No monotony is nice.  A monkey named [place] new nothing by name!  Master Vishal knew the news.  New of the moon is magnificent.  Make made ninety nuns nap.  Nice men knock on new doors.  [place] meets many mountains.    Source: The Source for Voice Disorders - Adolescent & Adult,     2004 [employer]       Paragraph with breathing: Use the visual cues as a reminder to breathe. You do not have to use all of them. Think of slowing down or pausing to breathe, not your rate of speech. Be aware of the vocal arana. IF this occurs, sing the phrase and then speak it as if you are singing. You can use this paragraph, a different one, a song, or a poem to where you put in visual reminders to breathe. It is always good to breathe at the end of a sentence, after a comma, or after every 7-8 words.     EDUARD'S WIFE, // PEMA, // FOUND OUT ONE DAY // THAT SHE WAS ACTUALLY // EDUARD'S FIFTH WIFE. // THAT HE HAD // A SERIES OF MARRIAGES. // ONE RIGHT AFTER THE OTHER. // EACH OF THE PRECEDING WIVES // HAD NO KNOWLEDGE // THAT EDUARD WAS // ALREADY  , // AND CERTAINLY NOT // SEVERAL TIMES BEFORE. // BY TALKING WITH OTHER WOMEN // AT HER HEALTH CLUB, // PEMA FOUND OUT // THE NAMES AND ADDRESSES // OF THE OTHER WIVES // TO WHOM EDUARD WAS STILL . // PEMA GATHERED // ALL OF THE WIVES // TOGETHER ONE NIGHT. 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 // TOGETHER, // THEY PLANNED A HOMECOMING // THAT EDUARD WOULD NEVER FORGET.     In conversation, be mindful of when you are using vocal arana. I noticed that when you knew what to say, the vocal arana reduced. When you weren't as sure or let a sentence trail off, that is when the arana would come in.

## 2023-12-21 NOTE — PROGRESS NOTES
Diana Rothman is a 33 year old female who is being evaluated via a billable video visit.      Diana has been notified and verbally consented to the following:   This video visit will be conducted between you and your provider.  Patient has opted to conduct today's video visit vs an in-person appointment.   Video visits are billed at different rates depending on your insurance coverage. Please reach out to your insurance provider with any questions.   If during the course of the call the provider feels the appointment is not appropriate, you will not be charged for this service.  Provider has received verbal consent for billable virtual visit from the patient? Yes  Will anyone else be joining your video visit? No    Call initiated at: 09:00 am   Type of Visit Platform Used: [place] Video  Location of provider: Home  Location of patient: [facility]  Luis Mcneil Jr., M.D., F.A.C.S.  Annie Rodriguez M.D., M.P.H.  Khalida Fairchild M.D.  Dora Prince M.M. (voice), M.A., CCC-SLP  Dorie Manuel M.A., CCC-SLP  Azeb Olvera, Ph.D., CCC-SLP  Himanshu Ramesh, Ph.D., CCC-SLP  Rubia Perkins M.S., CCC-SLP  Quintin Causey M.M., M.A., CCC-SLP  ABIOLA Oliveira (voice), M.S., CCC-SLP    [facility]  VOICE/SPEECH/BREATHING THERAPY PROGRESS REPORT    Patient: Diana Rothman  Date of Service: 12/21/2023    Date of Last Service: 12/15/2023  Referring physician: Dr. Rodriguez  Impressions from the initial evaluation on 07/03/2023: This evaluation has resulted in the following diagnosis/diagnoses for Ms. 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)    Laryngeal evaluation demonstrated white granulation observed by the left vocal fold process at the left medial arytenoid wall; very slight varix observed at the left vocal fold near the medial edge  "(mid fold area); Narrow Band Imaging yielded the following: apparent white granulation observed by the left vocal fold process at the left medial arytenoid wall and mild erythema observed bilaterally on the vocal folds L>R; erythema of the medial arytenoid walls; with simulated heavy breathing, anterior rocking of arytenoids was seen, however, the vocal folds remained opened; she was responsive to the rounded lip inhale, puppy sniffs, and exhaling on \"Sh\" and like blowing out candles; slight droopiness observed of the right arytenoid at rest; variable mild-moderate four-way constriction of the supraglottic larynx during connected speech  Perceptual evaluation demonstrated frequent vocal arana.     I had the pleasure of seeing Ms. 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 Rothman worked on therapeutic activities to address the above diagnosis.      Ms. Rothman also states that:  She is having more mucus and coughing/throat clearing, but she feels like she is getting sick.   She is feel a tickle more often than normal.   She tried not to throat clear recently but she couldn't even talk due to mucus, so she did the silent throat clear.   She is more aware of the lump in the throat feeling now.   We discussed that the ways she is speaking may be contributing to this and she agrees.     Ms. Rothman presents today with the following:    Voice quality:  Frequent vocal arana  Intermittent mild-moderate roughness and strain  Cough/ Throat clear:  Not observed      THERAPEUTIC ACTIVITIES  Today Ms. Rothman participated in the following therapeutic activities:  Asked many questions about the nature of her symptoms, and I answered all of these thoroughly.  [place] " exercises to experience a more forward sensation during phonation.  speech material with nasal continuants was facilitating  able to recognize improvement in quality and comfort  able to progress from syllable to sentence level  good learning, but will need practice  [place] exercises improve optimal breath flow and to reduce glottal arana   Instructed with reading a paragraph aloud with visual cues to breathe  We discussed her being aware of when she uses vocal arana and when she doesn't in conversation  I provided an after visit summary to help facilitate practice.      IMPRESSIONS/GOALS/PLAN  Ms. 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     She will continue to work on her exercises on a daily basis, and work on incorporating the techniques into her daily activities.    Progress toward long-term goals:   Adequate progress; please see above    Goals for this practice period:   practice all exercises according to instructions  incorporate techniques into daily vocal activities  maintain vigilance for vocal technique    Plan: I will see Ms. Rothman on 01/04/2024 to work on education, modification, and carryover of therapeutic activities to more complex activities.    TOTAL SERVICE TIME: 40 minutes  TREATMENT (02388)  NO CHARGE FACILITY FEE    Dorie Manuel M.A., CCC-SLP  Speech-Language Pathologist  [facility]  [email or web address]  She/Her/Hers     Speech recognition software may have been used in this documentation; input is reviewed before signature to the best of my ability.

## 2024-01-02 ENCOUNTER — MYC MEDICAL ADVICE (OUTPATIENT)
Dept: OTOLARYNGOLOGY | Facility: CLINIC | Age: 34
End: 2024-01-02
Payer: COMMERCIAL

## 2024-01-04 ENCOUNTER — VIRTUAL VISIT (OUTPATIENT)
Dept: OTOLARYNGOLOGY | Facility: CLINIC | Age: 34
End: 2024-01-04
Payer: COMMERCIAL

## 2024-01-04 ENCOUNTER — TELEPHONE (OUTPATIENT)
Dept: OTOLARYNGOLOGY | Facility: CLINIC | Age: 34
End: 2024-01-04

## 2024-01-04 DIAGNOSIS — J38.7 LARYNGEAL HYPERFUNCTION: ICD-10-CM

## 2024-01-04 DIAGNOSIS — J38.7 IRRITABLE LARYNX SYNDROME: ICD-10-CM

## 2024-01-04 DIAGNOSIS — R09.89 CHRONIC THROAT CLEARING: Primary | ICD-10-CM

## 2024-01-04 DIAGNOSIS — J38.3 PARADOXICAL VOCAL FOLD MOVEMENT: ICD-10-CM

## 2024-01-04 PROCEDURE — 92507 TX SP LANG VOICE COMM INDIV: CPT | Mod: GN | Performed by: SPEECH-LANGUAGE PATHOLOGIST

## 2024-01-04 NOTE — PROGRESS NOTES
Diana Rothman is a 33 year old female who is being evaluated via a billable video visit.      Diana has been notified and verbally consented to the following:   This video visit will be conducted between you and your provider.  Patient has opted to conduct today's video visit vs an in-person appointment.   Video visits are billed at different rates depending on your insurance coverage. Please reach out to your insurance provider with any questions.   If during the course of the call the provider feels the appointment is not appropriate, you will not be charged for this service.  Provider has received verbal consent for billable virtual visit from the patient? Yes  Will anyone else be joining your video visit? No    Call initiated at: 09:00 am   Type of Visit Platform Used: [employer]  Location of provider: Home  Location of patient: [facility]  Luis Mcneil Jr., M.D., F.A.C.S.  Annie Rodriguez M.D., M.P.H.  Khalida Fairchild M.D.  Dora Prince M.M. (voice), M.A., CCC-SLP  Dorie Manuel M.A., CCC-SLP  Azeb Olvera, Ph.D., CCC-SLP  Himanshu Ramesh, Ph.D., CCC-SLP  Rubia Perkins M.S., CCC-SLP  Quintin Causey M.M., M.A., CCC-SLP  ABIOLA Oliveira (voice), M.S., CCC-SLP    [facility]  VOICE/SPEECH/BREATHING THERAPY PROGRESS REPORT    Patient: Diana Rothman  Date of Service: 1/4/2024    Date of Last Service: 12/21/2023  Referring physician: Dr. Rodriguez  Impressions from the initial evaluation on 07/03/2023: This evaluation has resulted in the following diagnosis/diagnoses for Ms. 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)    Laryngeal evaluation demonstrated white granulation observed by the left vocal fold process at the left medial arytenoid wall; very slight varix observed at the left vocal fold near the medial edge  "(mid fold area); Narrow Band Imaging yielded the following: apparent white granulation observed by the left vocal fold process at the left medial arytenoid wall and mild erythema observed bilaterally on the vocal folds L>R; erythema of the medial arytenoid walls; with simulated heavy breathing, anterior rocking of arytenoids was seen, however, the vocal folds remained opened; she was responsive to the rounded lip inhale, puppy sniffs, and exhaling on \"Sh\" and like blowing out candles; slight droopiness observed of the right arytenoid at rest; variable mild-moderate four-way constriction of the supraglottic larynx during connected speech  Perceptual evaluation demonstrated frequent vocal arana.     I had the pleasure of seeing Ms. 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 Rothman worked on therapeutic activities to address the above diagnosis.    Regarding practice, Ms. Rothman reports the following:   She hasn't been practicing due to the holidays.   She did the breathing exercises and doesn't feel like they helped.     Ms. Rothman also states that:  Her voice feels a little hoarse, however, she is not sure if it is vocal arana or actual hoarseness.   She feels like she is doing more throat clearing.   She tries the strategies and then she ends up throat clearing.   She noticed a tightness and difficulty breathing in [place]. It is never to the point where she can't breathe.      Ms. Rothman presents today with the following:  Voice quality:  Intermittent frequent vocal arana/ mild hoarseness  Cough/ Throat clear:  Not observed.     THERAPEUTIC ACTIVITIES  Today Ms. Rothman participated in the following therapeutic activities:  Asked many questions about the " "nature of her symptoms, and I answered all of these thoroughly.  We reviewed everything briefly that has been taught to her in voice therapy.   Today when she demonstrated the spacious speech with a yawn, it triggered a cough and she said \"something felt caught.\"  [place] stretching techniques  to the tender areas of her neck, in order to reduce tension and discomfort.  [place] exercises to experience a more forward sensation during phonation.  speech material that elicits a high, forward tongue position was facilitating /j,U, w/  able to recognize improvement in quality and comfort  able to progress to level of sentences  good learning, but will need practice  [place] concepts of an optimal regimen for practice.  she should use an interval schedule of practice, with brief periods of practice frequently throughout each day  I provided an after visit summary to help facilitate practice.      IMPRESSIONS/GOALS/PLAN  Ms. 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     She will continue to work on her exercises on a daily basis, and work on incorporating the techniques into her daily activities.    Progress toward long-term goals:   Adequate but incomplete progress; please see above    Goals for this practice period:   practice all exercises according to instructions  incorporate techniques into daily vocal activities  maintain vigilance for vocal technique    Plan:  Ms. Rothman  will see my colleague Lizz Medina M.S., CCC-SLP. to work on education, modification, and carryover of therapeutic activities to more complex activities.      TOTAL " SERVICE TIME: 40 minutes  TREATMENT (57314)  NO CHARGE FACILITY FEE    Dorie Manuel M.A., CCC-SLP  Speech-Language Pathologist  [facility]  [email or web address]  She/Her/Hers     Speech recognition software may have been used in this documentation; input is reviewed before signature to the best of my ability.

## 2024-01-04 NOTE — LETTER
1/4/2024       RE: Diana Rothman  [address]  [place] [address]     Dear Colleague,    Thank you for referring your patient, Diana Rothman, to the [facility]. Please see a copy of my visit note below.    Diana Rothman is a 33 year old female who is being evaluated via a billable video visit.      Diana has been notified and verbally consented to the following:   This video visit will be conducted between you and your provider.  Patient has opted to conduct today's video visit vs an in-person appointment.   Video visits are billed at different rates depending on your insurance coverage. Please reach out to your insurance provider with any questions.   If during the course of the call the provider feels the appointment is not appropriate, you will not be charged for this service.  Provider has received verbal consent for billable virtual visit from the patient? Yes  Will anyone else be joining your video visit? No    Call initiated at: 09:00 am   Type of Visit Platform Used: [employer]  Location of provider: Home  Location of patient: [facility] VOICE [facility]  Luis Mcneli Jr., M.D., F.A.C.S.  Annie Rodriguez M.D., M.P.H.  Khalida Fairchild M.D.  Dora Prince M.M. (voice), M.A., CCC-SLP  Dorie Manuel M.A., CCC-SLP  Azeb Olvera, Ph.D., CCC-SLP  Himanshu Ramesh, Ph.D., CCC-SLP  Rubia Perkins M.S., CCC-SLP  Quintin Causey M.M., M.A., CCC-SLP  ABIOLA Oliveira (voice), M.S., CCC-SLP    [facility]  VOICE/SPEECH/BREATHING THERAPY PROGRESS REPORT    Patient: Diana Rothman  Date of Service: 1/4/2024    Date of Last Service: 12/21/2023  Referring physician: Dr. Rodriguez  Impressions from the initial evaluation on 07/03/2023: This evaluation has resulted in the following diagnosis/diagnoses for Ms. 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)    Laryngeal evaluation demonstrated white granulation observed by the left vocal fold process at the left medial arytenoid wall; very slight varix observed at the left vocal fold near the medial edge (mid fold area); Narrow Band Imaging yielded the following: apparent white granulation observed by the left vocal fold process at the left medial arytenoid wall and mild erythema observed bilaterally on the vocal folds L>R; erythema of the medial arytenoid walls; with simulated heavy breathing, anterior rocking of arytenoids was seen, however, the vocal folds remained opened; she was responsive to the rounded lip inhale, puppy sniffs, and exhaling on \"Sh\" and like blowing out candles; slight droopiness observed of the right arytenoid at rest; variable mild-moderate four-way constriction of the supraglottic larynx during connected speech  Perceptual evaluation demonstrated frequent vocal arana.     I had the pleasure of seeing Ms. 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 Rothman worked on therapeutic activities to address the above diagnosis.    Regarding practice, Ms. Rothman reports the following:   She hasn't been practicing due to the holidays.   She did the breathing exercises and doesn't feel like they helped.     Ms. Rothamn also states that:  Her voice feels a little hoarse, however, she is not sure if it is vocal arana or actual hoarseness.   She feels like she is doing more throat clearing.   She tries the strategies and then she ends up throat clearing.   She noticed a tightness and difficulty breathing in [place]. It is never to the " "point where she can't breathe.      Ms. Rothman presents today with the following:  Voice quality:  Intermittent frequent vocal arana/ mild hoarseness  Cough/ Throat clear:  Not observed.     THERAPEUTIC ACTIVITIES  Today Ms. Rothman participated in the following therapeutic activities:  Asked many questions about the nature of her symptoms, and I answered all of these thoroughly.  We reviewed everything briefly that has been taught to her in voice therapy.   Today when she demonstrated the spacious speech with a yawn, it triggered a cough and she said \"something felt caught.\"  [place] stretching techniques  to the tender areas of her neck, in order to reduce tension and discomfort.  [place] exercises to experience a more forward sensation during phonation.  speech material that elicits a high, forward tongue position was facilitating /j,U, w/  able to recognize improvement in quality and comfort  able to progress to level of sentences  good learning, but will need practice  [place] concepts of an optimal regimen for practice.  she should use an interval schedule of practice, with brief periods of practice frequently throughout each day  I provided an after visit summary to help facilitate practice.      IMPRESSIONS/GOALS/PLAN  Ms. 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     She will continue to work on her exercises on a daily basis, and work on incorporating the techniques into her daily activities.    Progress toward long-term goals:   Adequate but incomplete progress; please see above    Goals for this practice period:   practice all " exercises according to instructions  incorporate techniques into daily vocal activities  maintain vigilance for vocal technique    Plan:  Ms. Rothman  will see my colleague Lizz Medina M.S., CCC-SLP. to work on education, modification, and carryover of therapeutic activities to more complex activities.      TOTAL SERVICE TIME: 40 minutes  TREATMENT ([id number])  NO CHARGE FACILITY FEE    Dorie Manuel M.A., CCC-SLP  Speech-Language Pathologist  [facility]  [email or web address]  She/Her/Hers     Speech recognition software may have been used in this documentation; input is reviewed before signature to the best of my ability.       Again, thank you for allowing me to participate in the care of your patient.      Sincerely,    Dorie Manuel, SLP

## 2024-01-04 NOTE — PATIENT INSTRUCTIONS
Yash Ortiz,    It was wonderful to work with you. I will see if any of my colleagues can see you before you appointment with Dr. Rodriguez. Here is everything we discussed.    Dorie Manuel M.A., CCC-SLP  Speech-Language Pathologist  [facility]  [email or web address]  She/Her/Hers     We briefly reviewed everything today. I put all of the instructions together below.     Laryngeal Stretches (Do this in the mornings or any time you feel tension)    Front Neck Stretch:  1.Jut out your lower jaw like a bulldog  2. Look up to the kunal while keeping your jaw jutted out.  3. Hold for 30-60 seconds.     Light Neck Stretch:  Sit with your hand in your lap, hands facing up toward the ceiling.  Slowly bow your head and then gradually move up and look to your shoulder.  You may be looking slightly behind your shoulder.  You can let your jaw remain open and relaxed or closed to get a deeper stretch.   You should feel a stretch in the side of your neck and maybe even your jaw.  Hold for 1-2 minutes.  Slowly bring head down back to bowing position and repeat on the other side.     Neck Stretch:  Sit up straight and put your hands behind your back.  [place] your head and then gradually move up and look to your shoulder.  You may be looking slightly behind your shoulder.  Hold for 1-2 minutes and don't forget to breathe.  Raise your head up and look to the corner of the ceiling or slightly behind you.   Hold for 1-2 minutes. Breathe  Gradually bring your head down back to bowing position and repeat on the other side.     Leaning Neck Stretch:   Lean your head over to your right side.  With your right hand, gently pull head more towards the right side. Try not to crunch your neck and shoulder. Leave space between the two.  With your left hand, reach the palm of the hand down towards the floor. Keep that arm straight.   Hold for 1-2 minutes.  Gently let go of your head and bring head up slowly to neutral position. Relax  left arm.  Repeat on the other side.      W/Y/U sentences for forward sensations. Linger in the starting sound to feel a forward buzz.   Where are you?  What are you doing?  You need to go to the store.  Yesterday was great.   Usually we meet at 6.   Yet there he was.   Yall are so funny  When will we go?  Yes meet me there.  Yep I'm here.   What time is it?    12/21/2023:  Resonant Voice Therapy: the goal is to place the sound forward (nose, teeth, lips, tongue, etc.) and reduce tension in your throat. IF it helps, sing the phrase first on one pitch (like a robot) and then speak the phrase almost as if you are singing.            M & N- Sentences     Target: resonance/reduce laryngeal focus     Goal: to maintain use of resonance and reduce laryngeal focus during sentence production        Cue:  Let's try sentences using a resonant voice.  Remember to keep the vibration in your oral and nasal cavities - away from your throat.      M Sentences  Meet my mom.  Mix more milk.  Merge more movies.  Moths make a mess.  March for a mile.  Garry my mailbag.  Make maple malt.  Mean marketers mumble.  Munch on mushy melons.  March on [employer].  Meet the man at the [email or web address].  Measure a million miles.  Many men make merry.  [place] mowers make a mess.  Mules must make mud pies.  Master movers never mess up.  Cadence made muffins in [place].  Metric machines mean more money.  Garry the message before I move.  Mom melted my magic mascot.  Kyler made me more mashed meat.  Mommy made me mash my M&Ms.  Middle school menaces are mean.  [place] makes men's minds mushy.  Music makes me move my muscles.  Musicians' mouthpieces make music.  Monsters move in the moonbeams.  My monument to myself.  [place] meets my merry motives.   Morning memos must be read in the [employer].  [employer] messengers sent the memos to me.  My mouse and monkey wear mini mittens.  Members made mobiles with modern matchboxes,  Monarchs love aime mangos in the  morning.  Mountain climbers are mystified by monumental peaks.  [place] of mud messed up the merging motorists.  Bender and mold are not manmade materials.  Markets in [place] collect money from mountaineers.  Mysterious marble is magnified with a microscope.  Marathon runners move quickly on the last mile.  Marshmallows are mysteriously missed.  Miniature mice made a home in my mousetrap.  My muse makes many mess moonstones.  My mask must meet medical mandates.  Multiple [employer] manufacture machines with modern microchips.        N Sentences  No news is nice.  Now the night is known.  Luis Felipe knew his nephew too.  Jim knew nice necklaces.  Next and now a newness is near.  Monica never needed more news.  Nureyev knows no neck monsters.  Never need null new neighbors.  No yang never neglected nourishment.  Neat nurses napped next to nature.   New knees need knee socks.      M&N Sentences  Movie news makes money.  No neck monsters need more money.  Normal necks make noise.  Many nations need men.  Negligent neighbors make mockery of names.  New and many navy men.  No monotony is nice.  A monkey named Main new nothing by name!  Master Vishal knew the news.  New of the moon is magnificent.  Make made ninety nuns nap.  Nice men knock on new doors.  [place] meets many mountains.     Source: The [employer] for Voice Disorders - Adolescent & Adult,     2004 [employer]         Paragraph with breathing: Use the visual cues as a reminder to breathe. You do not have to use all of them. Think of slowing down or pausing to breathe, not your rate of speech. Be aware of the vocal arana. IF this occurs, sing the phrase and then speak it as if you are singing. You can use this paragraph, a different one, a song, or a poem to where you put in visual reminders to breathe. It is always good to breathe at the end of a sentence, after a comma, or after every 7-8 words.      EDUARD'S WIFE, // PEMA, // FOUND OUT ONE DAY // THAT  SHE WAS ACTUALLY // EDUARD'S FIFTH WIFE. // THAT HE HAD // A SERIES OF MARRIAGES. // ONE RIGHT AFTER THE OTHER. // EACH OF THE PRECEDING WIVES // HAD NO KNOWLEDGE // THAT EDUARD WAS // ALREADY , // AND CERTAINLY NOT // SEVERAL TIMES BEFORE. // BY TALKING WITH OTHER WOMEN // AT HER HEALTH CLUB, // PEMA FOUND OUT // THE NAMES AND ADDRESSES // OF THE OTHER WIVES // TO WHOM EDUARD WAS STILL . // PEMA GATHERED // ALL OF THE WIVES // TOGETHER ONE NIGHT. 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 // TOGETHER, // THEY PLANNED A HOMECOMING // THAT EDUARD WOULD NEVER FORGET.      In conversation, be mindful of when you are using vocal arana. I noticed that when you knew what to say, the vocal arana reduced. When you weren't as sure or let a sentence trail off, that is when the arana would come in.     12/15/2023:  Laryngeal and Face Massage  Start at your temples massage them in circles. You can make your way down to your cheeks and temporomandibular joint.  Then you massage under your jaw using your thumbs to dig (not too hard) and massage. Start from the sides of your jaw and make your way towards the front.     Tongue Base Massage                                                           1.Using the knuckle of your index finger or thumb, begin massaging in small, gentle circles right under the chin.  You can also hold your chin with your index finger and use your thumb.  2. Work along the sides, middle, and tip of the bottom of the chin with a gentle pressure.    3. It is important to keep the chin level or down to avoid putting strain on the neck muscles.     Cheek Massage and Compression:  Using your thumbs, massage under your cheek bones.  Once your reached the highest point of your cheek bones (you will your gums and molars under the highest point), you will press your thumbs inward and upward.  Hold for 30 seconds  You may feel pressure  "in your sinuses.  Don't forget to breathe and let your jaw relax.     ThyroHyoid Space Massage- circles and then pull larynx down  1. Gently place a thumb and pointer finger on each side of your willie's apple, finding the slight indented groove along the top of each side.   2. Move your fingers in a front to back motion, massaging gently within the groove.   3. Then move your fingers in tiny circular motions, up and back, down and forward, repeat.  4. Inhale slowly with a yawny sensation, gently pulling downward along your willie's apple (Don't pinch hard, just enough to \"suggest\" a downward movement).   5.  Reset your fingers at the top of your larynx and repeat several times.   6.  Always be gentle- don't use enough pressure that you would bruise a peach or banana if you were holding one between your fingers.      See Saw Stretch  1. Gently place a thumb and pointer finger on each side of your willie's apple, finding the slight indented groove along the top of each side.   2. Move your thumb upward on one side while the pointer moves down on the other side.   3. Slowly switch your fingers back and forth, as if you were turning a radio dial up and down.  4. Always be gentle- don't use enough pressure that you would bruise a peach or banana if you were holding one between your fingers.      Lateral Laryngeal Stretch:  1. Position your hands like praying, then rotate both sets of fingers toward yourself and place them along each side of your willie's apple.   2. Gentle shift your willie's apple side-to-side, only about a centimeter in each direction.   3. Decide which direction feels easier.   4. Shift your willie's apple about a centimeter in the easier direction and hold, breathing slow, deep breaths for about 120 seconds.   5. Shift the other direction and hold, breathing slow, deep breaths for about 120 seconds.      Sternocleidomastoid Massage  1.Starting directly below the ears, use your hands to massage in circles. Work " your way down the sides of the neck, and into the shoulders. Work back up the side of the neck, and then work down the front of the neck.    2. You may use slightly more pressure for this massage, but stop if there is pain / throbbing.  3. Try turning your head to be better able to find these sternocleidomastoid muscles.  4. Use your hands to pull down along these muscles from below the ear to the shoulders, and from below the ear in a V shape towards the front.  5. Use your fingers to gently massage the  little twiggies  (a.k.a.: sternal origin of the Sternocleidomastoid Muscle that attaches to the medial end of the clavicle/collarbone) one at a time.                         For the semi-occluded vocal tract exercises, if anything feels effortful or worse after doing them, do not do them anymore. The goal is to feel a forward buzz at your nose, lips, teeth, tongue, etc and no effort pushing from the throat. When they are done correctly, they help warm up, reset, or cool down your voice throughout the day.         Spacious Speech Phrases  Frequency of practice: 2-3 times a day or any time you yawn or sigh.      Start with an open-throated breath (like with a sniff of something wonderful or the beginning of a yawn or a surprise breath).  Let the breath do the work.  Cross your arms and inhale through rounded lips. Phonate on the exhale like you are yawning.   Be light, breathy, spacious. Linger in the H sound if necessary.   Exaggerate inflection!  Glide over your entire pitch range.  These should feel effortless in your throat.  This is like a massage for your vocal folds, stretching and fartun the muscles, while vibrating in a spacious throat.  Think of using this during conversation as a way to get out of the vocal arana.      Heeeeeeeee   Haaaaaaaay   Aniketoooooooh   Benjaminuuuuuuu   Haaaaaaaah      Hi there   How are you?   Who are you?   Who is she?   Who is he?   Who are they?   Hello, hello, hello   How's it  going?     Clinical Massage Therapist: Nathalie Espinoza  [phone]  [email or web address]/       12/01/2023:     Irritable Larynx Syndrome     What is Irritable Larynx Syndrome?  Irritable Larynx Syndrome (ILS) is a cluster of symptoms not associated with a specific disease process. Individuals with ILS can have any combination of the following complaints:       Globus sensation (feeling of lump or some other sensation in the throat)  Throat irritation or burning sensation  Tightness of throat or neck  Chronic cough or throat clearing; sensation of need to clear throat  Effort or pain with swallowing  Paradoxical vocal fold motion (sensation of difficulty inhaling)  Laryngospasm (tightening of throat causing choking or difficulty inhaling)     What causes ILS?  ILS works in the same way as a mosquito bite: We might scratch the bite absentmindedly a couple of times, and suddenly we realize the bite really itches!  So we scratch it vigorously because that feels better for a few moments. In the long run though, scratching actually makes the itch worse.  In the same way, when individuals experience mild irritation in their throats for some reason, they might not realize that they've begun  scratching  the  itch,  (throat clearing) but over time the irritation worsens and becomes more noticeable.  This is how earlier, milder symptoms can be the precursors to more-severe symptoms. Chronic irritation of the mucosa in the laryngeal area can cause changes to the nerve pathways; they can become hyper-excitable, so that it only takes a small irritation to have a large sensory response (like a cough).  It's nice that the nervous system can learn, but in this case it has backfired!     Here are some possible irritants that can start the chain reaction (most individuals have more than one):  Upper respiratory infection with cough  Acid Reflux  Post Nasal Drainage  Allergens (e.g. tree, mold, pollen, pet  dander)  Cigarette smoke or other kinds of smoke  Odors (e.g. perfume, hairspray)  Food sensitivities  Strong Emotions (e.g. anxiety, stress)  Hyperfunction of the muscles of the vocal mechanism  Harsh chemicals/  Cold Air     Evaluation of ILS  At the [facility], an otolaryngologist and a speech-language pathologist work as a team to determine if ILS is a problem.  Medical evaluation and laryngeal examination rule out any other cause for the symptoms.  Some medical treatment may be advised.  Functional evaluation determines whether there are behavioral or lifestyle factors that are contributing to the symptoms.       Treatment of ILS  Treatment of ILS addresses the cause of irritation. This can include:   Treatment of Acid Reflux This may include: Medications (what your MD prescribes), Dietary Precautions (what you eat and what supplements you take), Lifestyle Precautions (when you eat, avoiding environmental irritants), and Mechanical Precautions (how much pressure you put on your lower esophageal sphincter).  Functional Speech Therapy with a Speech-Language Pathologist (SLP). Your SLP will educate you about the disorder, help you improve the environment of your mucosa to reduce irritation, improve the movement and function of your larynx, and help reduce muscle tension and restore muscle balance.  Further Medical treatment is sometimes used to restore the medical basis for normal function and sensation.  Your MD will discuss these possibilities with you if they are relevant.       Cough and Throat Clearing  The biological purpose of the larynx is to protect the airway (trachea and lungs). Coughing and throat clearing are mechanisms that are meant to assist in the protection of our airway. When we feel something such as liquid, food, saliva, dust, etc. near our vocal folds, we will clear our throats and cough as a protective reflex. We also cough or throat clear if our airway is irritated.      Why  can chronic coughing and throat clearing be harmful?  A cough is produced by squeezing the vocal folds together, building up pressure in the lungs, and then quickly forcing the vocal folds open to clear away whatever might be getting too close to our airway. This can be traumatic to the vocal folds, irritating them in the same way that our hands would be irritated if they were being slapped together over and over. Coughing for a long period of time can cause the mucosa of the larynx and vocal folds to become hypersensitive, making it feel like something is threatening the airway.  The vocal folds need to cough or throat clear even when there isn't an actual physical threat to the airway.  The chronic coughing or throat clearing results in even more coughing or throat clearing.        Strategies to Reduce Irritation  Your speech-language pathologist will teach you techniques to use muscles optimally, in order to reduce irritation.  In the meantime, you can start reducing the irritation, using the following strategies:     PREVENTATIVE WAYS TO REDUCE COUGH AND THROAT CLEARING:  Identifying your unique trigger; take steps to avoid it  Improve both systemic and topical (surface) hydration (dry mucosa is more easily irritated)  Drink adequate fluids   Steaming  Personal humidifiers, nebulizers, of facial steamers,   Take warm compress (wet warm washcloth) and place on your face and inhale the steam  Leave shower running once you've stepped out; when you're brushing your teeth or getting ready in the bathroom  Boil hot water and breathe in the steam.  Use a humidifier, especially in the bedroom at night  Sinus Rinse or Neti Pot  Isotonic saline (0.9%) nasal spray or gel (listed below)  Guaifenesin (e.g. Mucinex) to thin viscous secretions  Sucking on candy, or cough drops without menthol, or chew gum, to increase salivary stimulation  Stimulate oral cavity by eating fruits such as grapes, watermelon, apples, cantaloupe,  "etc.,      WHAT TO DO WHEN YOU FEEL THE TICKLE OR URGE TO COUGH:  Alternative Behaviors to coughing  Swallow hard  Stimulate your oral cavity:   Sip hot, cold, carbonated, or flavored water  Suck on ice chips  Bite your tongue or cheek (not too hard!)  Massage under your chin or neck  Gargle: with warm water, regular water or with warm water with saline that you spit out. You can do this after brushing your teeth, after meals, or any time you feel the urge to throat clear.   Gentle hums or vocal exercises taught to you by your SLP  Say  eh eh eh eh  silently (for a gentle, non-irritating throat-clear)     WHAT TO DO WHEN YOU CAN'T STOP COUGHING:   Inhalation   Do 3 quick puppy sniffs in through the nose  Inhale slowly through the nose like you are smelling cookies  Inhale slowly through rounded lips like you are silently slurping a spaghetti noodle or a thin straw  Inhale slowly through a thin straw (Guillory's straw or smaller)  Place the tip of your tongue at the roof of your mouth and inhale. You will feel the air rush by the sides of your tongue.   Exhalation  Exhale on a \"Sh\" like you are shushing bad kids  Exhale like you are blowing candles on a cake.            Rescue Breathing Strategies: Do these as needed when you feel short of breath  Inhalation   Do 3 quick puppy sniffs in through the nose  Inhale slowly through the nose like you are smelling cookies  Inhale slowly through rounded lips like you are silently slurping a spaghetti noodle or a thin straw  Inhale slowly through a thin straw (Guillory's straw or smaller)  Place the tip of your tongue at the roof of your mouth and inhale. You will feel the air rush by the sides of your tongue.   Exhalation  Exhale on a \"Sh\" like you are shushing bad kids  Exhale like you are blowing candles on a cake.         Respiratory Pacing Techniques with Exercise  When running, inhale for 4 counts and exhale for 4, 6, or 8 counts. You can inhale through rounded lips and " "exhale on \"sh\" or like you are blowing out candles  When biking, inhale for 4 counts and exhale for 4, 6, or 8 counts. You can inhale through rounded lips and exhale on \"sh\" or like you are blowing out candles  When lifting weights, inhale on the preparation and exhale when lifting or pushing the weight.   When performing crunches, inhale while laying flat on the floor and exhale on the crunch.       BELLY BREATHING (3 breaths per hour, until habit)  Cross you arms and place your hands on either side of your ribs  Inhale with rounded lips (silently slurping spaghetti noodle) and exhale like you are blowing out candles.   Slowly breathe in and feel your belly and ribcage expand, like filling a balloon, pushing out against your waistband  Slowly breathe out and feel your belly and ribcage crunch inward, like zipping tight pants  Repeat slowly and take breaks if you get dizzy  HELPFUL HINTS:  -- Some people find it easiest to practice while laying on their back or lean back in chair with one hand on their belly and one hand on their chest.      SEMI-OCCLUDED VOCAL TRACT EXERCISES: 5-7x a day for 2-3 minutes. Use a fat straw with a couple of inches of water in a cup.     If you don't have straw or cup you can do lip trills, Buzzy Z (like a bee), elephant (puff air in your upper lip), or humming (smile or yawn posture).  You can also use the straw alone.      Cup and Bubble Exercises   WHY: Though these exercises seems (and feels) silly they are helpful for a number of reasons. First, they make you use your air generously and consistently, helping you to coordinate your breath and your voice. Second, they lengthen and narrow the vocal tract with the straw. This narrowing (or semi-occlusion in scientific terms) creates back pressure in your throat which has been shown to help the vocal folds vibrate more easily and reduce how hard some of the other muscles are squeezing.   HOW:   With Water - Fill the cup (or bottle) " "about 2 inches full of water. Blow bubbles through the straw while keeping your voice on. (kind of like making an \"ooooo\" sound through straw).Keep the water bubbling the whole time. That means your air is moving consistently.   Without Water - make sound through the straw (like it's a kazoo). Make sure you are using your air freely. You can hold your hand in front of the straw to test, and you should be able to feel the air moving.   Feel how open and relaxed your throat feels when you practice these sounds. If the bubbling or air stops or it gets tight, don't sweat it. Just breath, relax, and start again. Across all the exercises make sure you are getting a nice low breath and feeling the steady inward motion of low abdominal muscles when making sound.   Practice 5 times a day for no more than 3 minutes, and whenever you feel fatigued.   Aren't sure if you are doing it right? Ask yourself these three questions:   1. Does it feel easy?   2. Are the bubbles and voice consistent?   3. Do you feel that forward buzz?      What sounds to make:   Single pitches - use any comfortable pitch and sustain the note for as long as it feels free and easy, and your lips or tongue are bubbling.   Sighs - glide from high to low like you are sitting down in a comfortable chair after a long day of work   [place] - Start on a medium pitch and glide up just a bit and back down               07/03/2023:  Gargling: Gargle in the mornings, evenings, after meals, or anytime you feel irritation.  You can use warm water, regular water, or warm water with saline solution that you spit out.           Saline gargle recipe  8 oz water  1/4 tsp of  Kosher or sea salt (table salt is irritating for your throat)  1/4 tsp of baking soda  (Or use pre-made saline packets provided below)       Cough and Throat Clearing  The biological purpose of the larynx is to protect the airway (trachea and lungs). Coughing and throat clearing are mechanisms that are " meant to assist in the protection of our airway. When we feel something such as liquid, food, saliva, dust, etc. near our vocal folds, we will clear our throats and cough as a protective reflex. We also cough or throat clear if our airway is irritated.      Why can chronic coughing and throat clearing be harmful?  A cough is produced by squeezing the vocal folds together, building up pressure in the lungs, and then quickly forcing the vocal folds open to clear away whatever might be getting too close to our airway. This can be traumatic to the vocal folds, irritating them in the same way that our hands would be irritated if they were being slapped together over and over. Coughing for a long period of time can cause the mucosa of the larynx and vocal folds to become hypersensitive, making it feel like something is threatening the airway.  The vocal folds need to cough or throat clear even when there isn't an actual physical threat to the airway.  The chronic coughing or throat clearing results in even more coughing or throat clearing.        Strategies to Reduce Irritation  Your speech-language pathologist will teach you techniques to use muscles optimally, in order to reduce irritation.  In the meantime, you can start reducing the irritation, using the following strategies:        WHAT TO DO WHEN YOU FEEL THE TICKLE OR URGE TO COUGH:  Alternative Behaviors to coughing  Swallow hard  Sip hot, cold, carbonated, or flavored water  Gargle  Say  eh eh eh eh  silently (for a gentle, non-irritating throat-clear)

## 2024-02-12 ENCOUNTER — TELEPHONE (OUTPATIENT)
Dept: OTOLARYNGOLOGY | Facility: CLINIC | Age: 34
End: 2024-02-12

## 2024-02-12 ENCOUNTER — VIRTUAL VISIT (OUTPATIENT)
Dept: OTOLARYNGOLOGY | Facility: CLINIC | Age: 34
End: 2024-02-12
Payer: COMMERCIAL

## 2024-02-12 DIAGNOSIS — J38.3 PARADOXICAL VOCAL FOLD MOVEMENT: ICD-10-CM

## 2024-02-12 DIAGNOSIS — J38.7 LARYNGEAL HYPERFUNCTION: ICD-10-CM

## 2024-02-12 DIAGNOSIS — J38.7 IRRITABLE LARYNX SYNDROME: ICD-10-CM

## 2024-02-12 DIAGNOSIS — R09.89 CHRONIC THROAT CLEARING: Primary | ICD-10-CM

## 2024-02-12 PROCEDURE — 92507 TX SP LANG VOICE COMM INDIV: CPT | Mod: GN | Performed by: SPEECH-LANGUAGE PATHOLOGIST

## 2024-02-12 NOTE — LETTER
2/12/2024       RE: Diana Rothman  [address]  [place] [address]     Dear Colleague,    Thank you for referring your patient, iDana Rothman, to the [facility] VOICE CLINIC [place] at [facility]. Please see a copy of my visit note below.    Diana Rothman is a 33 year old female who is being evaluated via a billable video visit.      Diana has been notified and verbally consented to the following:   This video visit will be conducted between you and your provider.  Patient has opted to conduct today's video visit vs an in-person appointment.   Video visits are billed at different rates depending on your insurance coverage. Please reach out to your insurance provider with any questions.   If during the course of the call the provider feels the appointment is not appropriate, you will not be charged for this service.  Provider has received verbal consent for billable virtual visit from the patient? Yes  Will anyone else be joining your video visit? No    Call initiated at: 1100   Type of Visit Platform Used: [employer]  Location of provider: Home  Location of patient: [facility] VOICE CLINIC  PROGRESS REPORT (CPT 07010)    Patient: Diana Rothman  Date of Service: 2/12/2024  Date of Last Service: 1/4/24  Number of Visits: 6  Referring Physician: Dr. Annie Rodriguez and Dr. Kassandra Ortiz    Impressions from evaluation on 7/3/23 by Dorie Manuel M.A., CCC-SLP:  This evaluation has resulted in the following diagnosis/diagnoses for Ms. 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 Laryngeal evaluation demonstrated white granulation observed by the left vocal fold process at the left medial arytenoid wall; very slight varix observed at the left vocal fold near the medial edge  "(mid fold area); Narrow Band Imaging yielded the following: apparent white granulation observed by the left vocal fold process at the left medial arytenoid wall and mild erythema observed bilaterally on the vocal folds L>R; erythema of the medial arytenoid walls; with simulated heavy breathing, anterior rocking of arytenoids was seen, however, the vocal folds remained opened; she was responsive to the rounded lip inhale, puppy sniffs, and exhaling on \"Sh\" and like blowing out candles; slight droopiness observed of the right arytenoid at rest; variable mild-moderate four-way constriction of the supraglottic larynx during connected speech. Perceptual evaluation demonstrated frequent vocal arana.    SUBJECTIVE:  Since Diana's last session, she reports the following:   She reports she hasn't kept up with all of her strategies, but generally feels like her symptoms are largely the same.   She continues clearing her throat all the time due to a \"something there\" feeling in her throat.   She can try to not clear her throat, but still feels like she needs to do it when talking at work in order to get her voice out clearly.   She is still having difficulty breathing with throat tightness while exercising, and doesn't feel like the rescue breaths were super helpful so far.   She reports she probably does have some reflux issues, but     OBJECTIVE:  Patient Specific Goal Metrics:      2/12/2024    11:00 AM   Dysponia SLP Goals   How severe is your cough /throat clearing, if 0 is no cough at all and 10 is the worst cough? 5   How would you rate your breathing, if 0 is the worst and 10 is the best? 7   How much does your cough/throat-clearing problem bother you?            Quite a bit   How much does your breathing problem bother you?         Somewhat       THERAPEUTIC ACTIVITIES  Today Diana participated in the following therapeutic activities:  Counseling and Education:  Asked questions about the nature of her symptoms, and " "I answered all of these thoroughly.  Education regarding the natural of irritable larynx syndrome and how the laryngeal anatomy and physiology causes her symptoms.    I provided Diana with explanation and skilled instruction of:  Rescue breathing strategies using oral configurations to promote improved vocal fold abduction were instructed  Patient reported rounded lip inhalation (\"noodle slurp\"), the puppy yawn, and the \"taco tongue\" was most beneficial  Patient was able to demonstrate use of these techniques in combination with low respiratory engagement with good accuracy and moderate clinician support  A plan for when and how to implement these strategies was developed, and the patient was encouraged to practice the techniques independent of distress at least twice daily to habituate their use.   Laryngeal release technique targeting reduced laryngeal elevation and constriction and improved vocal fold aBduction was instructed  Patient was instructed through negative practice of laryngeal elevation with high-frequency, closed vowel productions and low-frequency, open vowel productions in an effort to develop somato-sensory awareness of the laryngeal musculature  This was combined with aBductory breathing maneuvers  Patient was instructed to utilize a silent, yawny, \"uh\"-shaped inhalation with application to all other therapy tasks  She demonstrated limited but progressing accuracy following moderate to maximal clinician support      Instruction of Home Practice:  A revised regimen for home practice was instructed.  I provided an AVS of important notes of today's therapeutic activities to facilitate practice.    ASSESSMENT/PLAN  Progress toward long-term goals:  Adequate but incomplete progress; please see above    Impressions:  IMPRESSIONS: Chronic Throat Clearing (R68.89), Laryngeal Hyperfunction (J38.7), Irritable Larynx Syndrome (ILS) (J38.7), Vocal Cord Dysfunction (VCD)/Paradoxical Vocal Fold Motion (PVFM) " "(J38.3)   Diana had a productive session of therapy today, working on rescue breathing and laryngeal release.  She will continue to work on her exercises on a daily basis, and work on incorporating the techniques into her daily activities. Speech therapy continues to be medically necessary for Diana to participate fully and engage in activities of daily living.     Plan:   I will see Diana in 8 weeks, due to her travel/work schedule, and will plan to check on laryngeal release and rescue breathing, add respiratory mechanics and instruction for adding activity. Would also benefit from LPR precautions and brief review of mucus management.     For home practice goals, see AVS.     TOTAL SERVICE TIME: 60 minutes  TREATMENT (87144)    Virgil Oliveira (voice), M.S., CCC-SLP  Speech-Language Pathologist  [facility]  [phone]  [email or web address]  Pronouns: she/her/hers      *this report was created in part through the use of computerized dictation software, and though reviewed following completion, some typographic errors may persist.  If there is confusion regarding any of this notes contents, please contact me for clarification    OBJECTIVE FINDINGS  PATIENT REPORTED MEASURES  Patient Supplied Answers To Last 2 VHI Questionnaires      6/28/2023    10:32 AM   Voice Handicap Index (VHI-10)   My voice makes it difficult for people to hear me 0   People have difficulty understanding me in a noisy room 0   My voice difficulties restrict my personal and social life.  2   I feel left out of conversations because of my voice 0   My voice problem causes me to lose income 0   I feel as though I have to strain to produce voice 1   The clarity of my voice is unpredictable 2   My voice problem upsets me 2   My voice makes me feel handicapped 0   People ask, \"What's wrong with your voice?\" 1   VHI-10 8          Patient Supplied Answers To Last 2 CSI Questionnaires      1/3/2024     8:54 PM 2/12/2024    " 10:49 AM   Cough Severity Index (CSI)   My cough is worse when I lie down 0 1   My coughing problem causes me to restrict my personal and social life 0 0   I tend to avoid places because of my cough problem 0 0   I feel embarrassed because of my coughing problem 2 2   People ask, ''What's wrong?'' because I cough a lot 2 2   I run out of air when I cough 0 0   My coughing problem affects my voice 2 2   My coughing problem limits my physical activity 0 0   My coughing problem upsets me 2 2   People ask me if I am sick because I cough a lot 2 2   CSI Score 10 11          Patient Supplied Answers To Last 2 EAT Questionnaires       No data to display                  Patient Supplied Answers to Dyspnea Index Questionnaire:      2/12/2024    10:49 AM   Dyspnea Index   1. I have trouble getting air in. 1   2. I feel tightness in my throat when I am having jayme breathing problem. 4   3. It takes more effort to breathe than it used to. 1   4. Change in weather affect my breathing problem. 0   5. My breathing gets worse with stress. 0   6. I make sound/noise breathing in 2   7. I have to strain to breathe. 2   8. My shortness of breath gets worse with exercise or physical activity 4   9. My breathing problem makes me feel stressed. 2   10. My breathing problem casuses me to restrict my personal and social life. 0   Dyspnea Index Total Score 16          Again, thank you for allowing me to participate in the care of your patient.      Sincerely,    Lizz Medina, SLP

## 2024-02-12 NOTE — PATIENT INSTRUCTIONS
"Hello!    It was a pleasure to see you today. Please complete the exercises below and remember, a few minutes of practice many times throughout the day is more important than one large practice session. If you have any questions about your strategies, feel free to contact me at [email or web address] or via [employer]. Please call [phone] for scheduling alterations or to be seen sooner in case of cancellations.     If your appointment is virtual, please remember to logon to [employer] a few minutes early to complete the questionnaires before your visit starts.     Home Exercise Program:  RESCUE BREATHS (4-5 breaths, 2-3 sets/day and IF SHORT OF BREATH)  -- Use a mirror if it helps to see your mouth and lips  -- Remember to focus on SILENT inhalation every time  -- Practice these daily when you're relaxed and focus on correct technique. The more you practice, the easier these become when you are not relaxed.  STRAW SIP & BULLFROG: Round your lips like a milkshake straw and inhale, feeling cool & silent air sweep through the center of your mouth. Exhale with pursed lips and ballooned or bullfrogged cheeks and throat, or on a \"shhhh\", \"sh sh sh sh sh...\", or \"ffffff\", whichever feels better.   PUPPY YAWN & BULLFROG: Let your tongue relax flat against your bottom lip like a puppy, keeping your lips and jaw open. Silently inhale with a cool, yawny sensation. Next, exhale with pursed lips and ballooned or bullfrogged cheeks and throat, or on a \"shhhh\", \"sh sh sh sh sh...\", or \"ffffff\", whichever feels better. Blow more gently and slower if you feel dizzy.  TACO TONGUE & BULLFROG: If you can, curl your tongue slightly like a hard taco shell, keeping your lips and jaw open. Silently inhale with a cool, yawny sensation. Next, exhale with pursed lips and ballooned or bullfrogged cheeks and throat, or on a \"shhhh\", \"sh sh sh sh sh...\", or \"ffffff\", whichever feels better.  Blow more gently and slower if you feel " "dizzy.  EDVIN or \"Hand Breath\": Place an ice-cream cone shaped hand against open, rounded lips with your tongue flat in your mouth. Slowly inhale and feel a silent, low breath sweep in and down your throat. Now tighten your hand to a fist and gently blow out, feeling a ballooning stretch through your cheeks and throat. Avoid blowing so hard that you feel strained.   TONGUE TUCK: Place the tip of your tongue against the roof of your mouth, creating a column in the center. Breathe in and feel cool & silent air sweep in along the sides of your tongue and down the back of your throat. Now keep your tongue tip in place and simply exhale.   This strategy will make your mouth dry, so stop and gently nibble the sides of your tongue with your molars. This tricks your brain into making your mouth water and move some saliva around with your tongue whenever needed, then return to your strategy.   The tongue tuck is easiest to learn and start applying to movement, but doesn't open the vocal folds as forcefully as some other strategies.   This works really well when wearing a mask to make the air flow feel more natural.  SNIFF (Sniff) & BULLFROG: Decide whether you prefer one long, gentle sniff in through your nose or 2-3 short, quick sniffs. Exhale with pursed lips and ballooned or bullfrogged cheeks and throat, or on a \"shhhh\", \"sh sh sh sh sh...\", or \"ffffff\", whichever feels better. Blow more gently and slower if you feel dizzy.    ____________________________________________________      LARYNGEAL RELEASE  Learning Tasks (only as needed)  1. Place your finger tips along the center of your throat/Alberto's apple  2. Swallow and notice your voice box move up and back down  3. Compare a yawn, high-pitch EE (voice box goes up slightly) with a silent, yawny sigh on \"Huh\" like the word \"hug\" (voice box goes down slightly), baby gorilla, donkey    EXERCISES (5 breaths/hour- looking in a mirror)   1. Drop your mouth OPEN like the " "word \"bleh\" with your tongue relaxed forward and flat.   2. Slowly inhale with a SILENT, yawny breath, feeling cool and quiet air flow in across your tongue and far down the back of your throat.   3. See if you willie's apple drops ever so slightly as you inhale with your SILENT uh feeling.   4. Repeat slowly 5 times in a row (slowly so you don't get dizzy)  ____________________________________________________          Thank you and have a great day!  Terry Kirkland. (voice), M.S., CCC-SLP  Speech-Language Pathologist  [facility]  [phone]  [email or web address]  Pronouns: she/her/hers     "

## 2024-02-12 NOTE — PROGRESS NOTES
Diana Rothman is a 33 year old female who is being evaluated via a billable video visit.      Diana has been notified and verbally consented to the following:   This video visit will be conducted between you and your provider.  Patient has opted to conduct today's video visit vs an in-person appointment.   Video visits are billed at different rates depending on your insurance coverage. Please reach out to your insurance provider with any questions.   If during the course of the call the provider feels the appointment is not appropriate, you will not be charged for this service.  Provider has received verbal consent for billable virtual visit from the patient? Yes  Will anyone else be joining your video visit? No    Call initiated at: 1100   Type of Visit Platform Used: [employer] Video  Location of provider: Home  Location of patient: [facility]  PROGRESS REPORT (CPT 55453)    Patient: Diana Rothman  Date of Service: 2/12/2024  Date of Last Service: 1/4/24  Number of Visits: 6  Referring Physician: Dr. Annie Rodriguez and Dr. Kassandra Ortiz    Impressions from evaluation on 7/3/23 by Dorie Manuel M.A., CCC-SLP:  This evaluation has resulted in the following diagnosis/diagnoses for Ms. 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 Laryngeal evaluation demonstrated white granulation observed by the left vocal fold process at the left medial arytenoid wall; very slight varix observed at the left vocal fold near the medial edge (mid fold area); Narrow Band Imaging yielded the following: apparent white granulation observed by the left vocal fold process at the left medial arytenoid wall and mild erythema observed bilaterally on the vocal folds L>R; erythema of the medial arytenoid walls; with simulated heavy breathing, anterior rocking of  "arytenoids was seen, however, the vocal folds remained opened; she was responsive to the rounded lip inhale, puppy sniffs, and exhaling on \"Sh\" and like blowing out candles; slight droopiness observed of the right arytenoid at rest; variable mild-moderate four-way constriction of the supraglottic larynx during connected speech. Perceptual evaluation demonstrated frequent vocal arana.    SUBJECTIVE:  Since Diana's last session, she reports the following:   She reports she hasn't kept up with all of her strategies, but generally feels like her symptoms are largely the same.   She continues clearing her throat all the time due to a \"something there\" feeling in her throat.   She can try to not clear her throat, but still feels like she needs to do it when talking at work in order to get her voice out clearly.   She is still having difficulty breathing with throat tightness while exercising, and doesn't feel like the rescue breaths were super helpful so far.   She reports she probably does have some reflux issues, but     OBJECTIVE:  Patient Specific Goal Metrics:      2/12/2024    11:00 AM   Dysponia SLP Goals   How severe is your cough /throat clearing, if 0 is no cough at all and 10 is the worst cough? 5   How would you rate your breathing, if 0 is the worst and 10 is the best? 7   How much does your cough/throat-clearing problem bother you?            Quite a bit   How much does your breathing problem bother you?         Somewhat       THERAPEUTIC ACTIVITIES  Today Diana participated in the following therapeutic activities:  Counseling and Education:  Asked questions about the nature of her symptoms, and I answered all of these thoroughly.  Education regarding the natural of irritable larynx syndrome and how the laryngeal anatomy and physiology causes her symptoms.    I provided Diana with explanation and skilled instruction of:  Rescue breathing strategies using oral configurations to promote improved vocal fold " "abduction were instructed  Patient reported rounded lip inhalation (\"noodle slurp\"), the puppy yawn, and the \"taco tongue\" was most beneficial  Patient was able to demonstrate use of these techniques in combination with low respiratory engagement with good accuracy and moderate clinician support  A plan for when and how to implement these strategies was developed, and the patient was encouraged to practice the techniques independent of distress at least twice daily to habituate their use.   Laryngeal release technique targeting reduced laryngeal elevation and constriction and improved vocal fold aBduction was instructed  Patient was instructed through negative practice of laryngeal elevation with high-frequency, closed vowel productions and low-frequency, open vowel productions in an effort to develop somato-sensory awareness of the laryngeal musculature  This was combined with aBductory breathing maneuvers  Patient was instructed to utilize a silent, yawny, \"uh\"-shaped inhalation with application to all other therapy tasks  She demonstrated limited but progressing accuracy following moderate to maximal clinician support      Instruction of Home Practice:  A revised regimen for home practice was instructed.  I provided an AVS of important notes of today's therapeutic activities to facilitate practice.    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  She will continue to work on her exercises on a daily basis, and work on incorporating the techniques into her daily activities. Speech therapy continues to be medically necessary for Diana to " "participate fully and engage in activities of daily living.     Plan:   I will see Diana in 8 weeks, due to her travel/work schedule, and will plan to check on laryngeal release and rescue breathing, add respiratory mechanics and instruction for adding activity. Would also benefit from LPR precautions and brief review of mucus management.     For home practice goals, see AVS.     TOTAL SERVICE TIME: 60 minutes  TREATMENT (16757)    Virgil Oliveira (voice), M.S., CCC-SLP  Speech-Language Pathologist  [facility]  [phone]  [email or web address]  Pronouns: she/her/hers      *this report was created in part through the use of computerized dictation software, and though reviewed following completion, some typographic errors may persist.  If there is confusion regarding any of this notes contents, please contact me for clarification    OBJECTIVE FINDINGS  PATIENT REPORTED MEASURES  Patient Supplied Answers To Last 2 VHI Questionnaires      6/28/2023    10:32 AM   Voice Handicap Index (VHI-10)   My voice makes it difficult for people to hear me 0   People have difficulty understanding me in a noisy room 0   My voice difficulties restrict my personal and social life.  2   I feel left out of conversations because of my voice 0   My voice problem causes me to lose income 0   I feel as though I have to strain to produce voice 1   The clarity of my voice is unpredictable 2   My voice problem upsets me 2   My voice makes me feel handicapped 0   People ask, \"What's wrong with your voice?\" 1   VHI-10 8          Patient Supplied Answers To Last 2 CSI Questionnaires      1/3/2024     8:54 PM 2/12/2024    10:49 AM   Cough Severity Index (CSI)   My cough is worse when I lie down 0 1   My coughing problem causes me to restrict my personal and social life 0 0   I tend to avoid places because of my cough problem 0 0   I feel embarrassed because of my coughing problem 2 2   People ask, ''What's wrong?'' because I " cough a lot 2 2   I run out of air when I cough 0 0   My coughing problem affects my voice 2 2   My coughing problem limits my physical activity 0 0   My coughing problem upsets me 2 2   People ask me if I am sick because I cough a lot 2 2   CSI Score 10 11          Patient Supplied Answers To Last 2 EAT Questionnaires       No data to display                  Patient Supplied Answers to Dyspnea Index Questionnaire:      2/12/2024    10:49 AM   Dyspnea Index   1. I have trouble getting air in. 1   2. I feel tightness in my throat when I am having jayme breathing problem. 4   3. It takes more effort to breathe than it used to. 1   4. Change in weather affect my breathing problem. 0   5. My breathing gets worse with stress. 0   6. I make sound/noise breathing in 2   7. I have to strain to breathe. 2   8. My shortness of breath gets worse with exercise or physical activity 4   9. My breathing problem makes me feel stressed. 2   10. My breathing problem casuses me to restrict my personal and social life. 0   Dyspnea Index Total Score 16

## 2024-02-26 ENCOUNTER — OFFICE VISIT (OUTPATIENT)
Dept: OTOLARYNGOLOGY | Facility: CLINIC | Age: 34
End: 2024-02-26
Payer: COMMERCIAL

## 2024-02-26 VITALS
HEIGHT: 64 IN | HEART RATE: 63 BPM | SYSTOLIC BLOOD PRESSURE: 123 MMHG | BODY MASS INDEX: 24.24 KG/M2 | DIASTOLIC BLOOD PRESSURE: 75 MMHG | WEIGHT: 142 LBS

## 2024-02-26 DIAGNOSIS — R49.0 DYSPHONIA: Primary | ICD-10-CM

## 2024-02-26 DIAGNOSIS — J38.7 LESION OF LARYNX: ICD-10-CM

## 2024-02-26 DIAGNOSIS — J38.7 IRRITABLE LARYNX SYNDROME: ICD-10-CM

## 2024-02-26 DIAGNOSIS — R09.89 CHRONIC THROAT CLEARING: ICD-10-CM

## 2024-02-26 PROCEDURE — 99213 OFFICE O/P EST LOW 20 MIN: CPT | Mod: 25 | Performed by: OTOLARYNGOLOGY

## 2024-02-26 PROCEDURE — 99207 PR NO CHARGE LOS: CPT

## 2024-02-26 PROCEDURE — 31579 LARYNGOSCOPY TELESCOPIC: CPT | Performed by: OTOLARYNGOLOGY

## 2024-02-26 RX ORDER — TERBINAFINE HYDROCHLORIDE 250 MG/1
1 TABLET ORAL
COMMUNITY
Start: 2023-12-11 | End: 2024-03-15

## 2024-02-26 RX ORDER — BUPROPION HYDROCHLORIDE 150 MG/1
1 TABLET ORAL
COMMUNITY
Start: 2023-07-12 | End: 2024-03-15

## 2024-02-26 RX ORDER — ESCITALOPRAM OXALATE 10 MG/1
1 TABLET ORAL
COMMUNITY
Start: 2023-08-24 | End: 2024-03-15

## 2024-02-26 RX ORDER — NITROFURANTOIN 25; 75 MG/1; MG/1
CAPSULE ORAL
COMMUNITY
Start: 2023-12-07 | End: 2024-03-15

## 2024-02-26 ASSESSMENT — PAIN SCALES - GENERAL: PAINLEVEL: NO PAIN (0)

## 2024-02-26 NOTE — PROGRESS NOTES
Dear Colleague:  Diana Rothman recently returned for follow-up at the [facility] Voice [facility]. My clinic note from our visit is enclosed below.  Speech recognition software may have been used in the documentation below; input is reviewed before signature to the best of my ability.     I appreciate the ongoing opportunity to participate in this patient's care.    Please feel free to contact me with any questions.      Sincerely yours,  Annie Rodriguez M.D., M.P.H.  , Laryngology  Director, [facility]  Otolaryngology- Head & Neck Surgery  [phone]            =====  HISTORY OF PRESENT ILLNESS:  Diana oRthman is a pleasant 33 year old female with a history of mucosal irritation along the left medial arytenoid and mild dynamic/inducible laryngeal obstruction who returns in follow up today.     Today's updates:  - throat feels similar  - still getting used to voice changes, reducing vocal arana  - swallow fine  - breathing: rescue breaths are helpful  - currently moving        MEDICATIONS:     Current Outpatient Medications   Medication Sig Dispense Refill    buPROPion (WELLBUTRIN XL) 150 MG 24 hr tablet Take 1 tablet by mouth daily at 2 pm (Patient not taking: Reported on 2/26/2024)      escitalopram (LEXAPRO) 10 MG tablet Take 1 tablet by mouth daily at 2 pm (Patient not taking: Reported on 2/26/2024)      levonorgestrel (RADHA) 13.5 MG IUD Radha      nitroFURantoin macrocrystal-monohydrate (MACROBID) 100 MG capsule TAKE 1 CAPSULE BY MOUTH TWICE DAILY AFTER A MEAL (Patient not taking: Reported on 2/26/2024)      terbinafine (LAMISIL) 250 MG tablet Take 1 tablet by mouth daily at 2 pm (Patient not taking: Reported on 2/26/2024)         ALLERGIES:    Allergies   Allergen Reactions    Terbinafine Rash       NEW PMH/PSH: None    REVIEW OF SYSTEMS:  The patient completed a comprehensive 11 point review of systems (below), which was reviewed. Positives are as  noted below.  Patient Supplied Answers to Review of Systems      6/28/2023    10:26 AM   [facility] ENT ROS   Constitutional Weight gain    Appetite change    Problems with sleep   Psychology Frequently feeling anxious   Ears, Nose, Throat Nasal congestion or drainage   Gastrointestinal/Genitourinary Unexplained nausea/vomiting   Allergy/Immunology Skin changes   Endocrine Thirst            PHYSICAL EXAM:  General: The patient was alert and conversant, and in no acute distress.    Oral cavity/oropharynx: No masses or lesions. Dentition unchanged since prior. Tongue mobility and palate elevation intact and symmetric.  Neck: No palpable cervical lymphadenopathy, mild tenderness to palpation of the thyrohyoid space, which was narrow. No obvious thyroid abnormality.  Resp: Breathing comfortably, no stridor or stertor.  Neuro: Symmetric facial function. Other cranial nerve function as documented above.  Psych: Normal affect, pleasant and cooperative.  Voice/speech: No significant dysphonia. Occasional arana.       Last 2 Scores for Patient-Answered CSI Questionnaire      1/3/2024     8:54 PM 2/12/2024    10:49 AM   CSI Total Score   CSI Total Score 10 11       Procedure:   Flexible fiberoptic laryngoscopy and laryngovideostroboscopy  Indications: This procedure was warranted to evaluate the patient's laryngeal anatomy and function. Risks, benefits, and alternatives were discussed.  Description: After written informed consent was obtained, a time-out was performed to confirm patient identity, procedure, and procedure site. Topical 2% lidocaine and oxymetazoline were applied to the nasal cavities. I performed the endoscopy and no complications were apparent. Continuous and stroboscopic light were utilized to assess routine phonation and variable frequency phonation.  Performed by: Annie Rodriguez MD MPH  SLP: Himanshu Ramesh, PhD, CCC-SLP   Findings: Normal nasopharynx. Normal base of tongue, valleculae, and epiglottis. Vocal  fold mobility: right: normal; left: normal. Medial edges of the true vocal folds: smooth and straight.  Left true vocal fold with slightly more prominent varix compared to prior, no evidence of associated hemorrhage.  Glissade produced appropriate elongation. There was moderate supraglottic recruitment with connected speech. Mucosa of false vocal folds, aryepiglottic folds, piriform sinuses, and posterior glottis unremarkable overall; left medial arytenoid mucosal irritation seems slightly reduced but not resolved. Airway was patent.  Similar findings on NBI.    The addition of stroboscopy allowed evaluation of the mucosal wave.   Amplitude: right: normal; left: normal. Symmetry: intermittent symmetry. Closure pattern: complete. Closure plane: at glottic level. Phase distribution: normal.                                                    IMPRESSION AND PLAN:   Diana Rothman returns with a subtle reduction in the left presumed early granuloma; today there is perhaps some increased prominence of a left mid true vocal fold varix without any evidence of associated bleeding.    Plan:  - continue speech therapy  - reviewed precautions re heavy lifting while moving, both for granuloma and for varix  - RTC in 6 mo or sooner as needed; she would like to see how things and then decide about scheduling    I spent a total of 25 minutes on 2/26/2024 in chart review, review of tests, patient visit, documentation, care coordination, and/or discussion with other providers about the issues documented above, separate from any documented procedure(s).

## 2024-02-26 NOTE — PATIENT INSTRUCTIONS
1.  You were seen in the ENT Clinic today by . If you have any questions or concerns after your appointment, please call [phone]. Press option #1 for scheduling related needs. Press option #3 for Nurse advice.    2.   has recommended the following:   - precautions re heavy lifting while moving   - reach out to schedule if appt needed as per below    3.  Plan is to return to clinic in 6 months or as needed      Shea Martinez LPN  [phone]  [facility] - Otolaryngology

## 2024-02-26 NOTE — PROGRESS NOTES
[facility]  VOICE EVALUATION/LARYNGEAL EXAMINATION REPORT    Patient: Diana Rothman  Date of Service: 2/26/2024    HISTORY  PATIENT INFORMATION  Diana Rothman was seen for brief consultation in conjunction with a visit to Dr. Rodriguez today.  Please refer to the physician s dictation for a more complete history and impressions.  No evaluation or treatment were indicated. The patient will continue with current plan of care with primary speech pathologist.       No charge for today s session    Himanshu Ramesh, Ph.D., CCC-SLP  , Otolaryngology  Speech-Language Pathologist-[facility]  [phone]  he/him/his

## 2024-02-26 NOTE — LETTER
2/26/2024      RE: Diana Rothman  [address] [place] [address]       Dear Colleague:  Diana Rothman recently returned for follow-up at the [facility] Voice [facility]. My clinic note from our visit is enclosed below.  Speech recognition software may have been used in the documentation below; input is reviewed before signature to the best of my ability.     I appreciate the ongoing opportunity to participate in this patient's care.    Please feel free to contact me with any questions.      Sincerely yours,  Annie Rodriguez M.D., M.P.H.  , Laryngology  Director, [facility]  Otolaryngology- Head & Neck Surgery  [phone]            =====  HISTORY OF PRESENT ILLNESS:  Diana Rothman is a pleasant 33 year old female with a history of mucosal irritation along the left medial arytenoid and mild dynamic/inducible laryngeal obstruction who returns in follow up today.     Today's updates:  - throat feels similar  - still getting used to voice changes, reducing vocal arana  - swallow fine  - breathing: rescue breaths are helpful  - currently moving        MEDICATIONS:     Current Outpatient Medications   Medication Sig Dispense Refill    buPROPion (WELLBUTRIN XL) 150 MG 24 hr tablet Take 1 tablet by mouth daily at 2 pm (Patient not taking: Reported on 2/26/2024)      escitalopram (LEXAPRO) 10 MG tablet Take 1 tablet by mouth daily at 2 pm (Patient not taking: Reported on 2/26/2024)      levonorgestrel (FANNY) 13.5 MG IUD Fanny      nitroFURantoin macrocrystal-monohydrate (MACROBID) 100 MG capsule TAKE 1 CAPSULE BY MOUTH TWICE DAILY AFTER A MEAL (Patient not taking: Reported on 2/26/2024)      terbinafine (LAMISIL) 250 MG tablet Take 1 tablet by mouth daily at 2 pm (Patient not taking: Reported on 2/26/2024)         ALLERGIES:    Allergies   Allergen Reactions    Terbinafine Rash       NEW PMH/PSH: None    REVIEW OF SYSTEMS:  The patient completed a  comprehensive 11 point review of systems (below), which was reviewed. Positives are as noted below.  Patient Supplied Answers to Review of Systems      6/28/2023    10:26 AM   [facility] ROS   Constitutional Weight gain    Appetite change    Problems with sleep   Psychology Frequently feeling anxious   Ears, Nose, Throat Nasal congestion or drainage   Gastrointestinal/Genitourinary Unexplained nausea/vomiting   Allergy/Immunology Skin changes   Endocrine Thirst            PHYSICAL EXAM:  General: The patient was alert and conversant, and in no acute distress.    Oral cavity/oropharynx: No masses or lesions. Dentition unchanged since prior. Tongue mobility and palate elevation intact and symmetric.  Neck: No palpable cervical lymphadenopathy, mild tenderness to palpation of the thyrohyoid space, which was narrow. No obvious thyroid abnormality.  Resp: Breathing comfortably, no stridor or stertor.  Neuro: Symmetric facial function. Other cranial nerve function as documented above.  Psych: Normal affect, pleasant and cooperative.  Voice/speech: No significant dysphonia. Occasional arana.       Last 2 Scores for Patient-Answered CSI Questionnaire      1/3/2024     8:54 PM 2/12/2024    10:49 AM   CSI Total Score   CSI Total Score 10 11       Procedure:   Flexible fiberoptic laryngoscopy and laryngovideostroboscopy  Indications: This procedure was warranted to evaluate the patient's laryngeal anatomy and function. Risks, benefits, and alternatives were discussed.  Description: After written informed consent was obtained, a time-out was performed to confirm patient identity, procedure, and procedure site. Topical 2% lidocaine and oxymetazoline were applied to the nasal cavities. I performed the endoscopy and no complications were apparent. Continuous and stroboscopic light were utilized to assess routine phonation and variable frequency phonation.  Performed by: Annie Rodriguez MD MPH  SLP: Himanshu Ramesh, PhD, CCC-SLP    Findings: Normal nasopharynx. Normal base of tongue, valleculae, and epiglottis. Vocal fold mobility: right: normal; left: normal. Medial edges of the true vocal folds: smooth and straight.  Left true vocal fold with slightly more prominent varix compared to prior, no evidence of associated hemorrhage.  Glissade produced appropriate elongation. There was moderate supraglottic recruitment with connected speech. Mucosa of false vocal folds, aryepiglottic folds, piriform sinuses, and posterior glottis unremarkable overall; left medial arytenoid mucosal irritation seems slightly reduced but not resolved. Airway was patent.  Similar findings on NBI.    The addition of stroboscopy allowed evaluation of the mucosal wave.   Amplitude: right: normal; left: normal. Symmetry: intermittent symmetry. Closure pattern: complete. Closure plane: at glottic level. Phase distribution: normal.                                                    IMPRESSION AND PLAN:   Diana Rothman returns with a subtle reduction in the left presumed early granuloma; today there is perhaps some increased prominence of a left mid true vocal fold varix without any evidence of associated bleeding.    Plan:  - continue speech therapy  - reviewed precautions re heavy lifting while moving, both for granuloma and for varix  - RTC in 6 mo or sooner as needed; she would like to see how things and then decide about scheduling    I spent a total of 25 minutes on 2/26/2024 in chart review, review of tests, patient visit, documentation, care coordination, and/or discussion with other providers about the issues documented above, separate from any documented procedure(s).      Annie Rodriguez MD

## 2024-03-03 ENCOUNTER — HEALTH MAINTENANCE LETTER (OUTPATIENT)
Age: 34
End: 2024-03-03

## 2024-03-10 SDOH — HEALTH STABILITY: PHYSICAL HEALTH: ON AVERAGE, HOW MANY MINUTES DO YOU ENGAGE IN EXERCISE AT THIS LEVEL?: 30 MIN

## 2024-03-10 SDOH — HEALTH STABILITY: PHYSICAL HEALTH: ON AVERAGE, HOW MANY DAYS PER WEEK DO YOU ENGAGE IN MODERATE TO STRENUOUS EXERCISE (LIKE A BRISK WALK)?: 2 DAYS

## 2024-03-10 ASSESSMENT — SOCIAL DETERMINANTS OF HEALTH (SDOH): HOW OFTEN DO YOU GET TOGETHER WITH FRIENDS OR RELATIVES?: ONCE A WEEK

## 2024-03-15 ENCOUNTER — OFFICE VISIT (OUTPATIENT)
Dept: FAMILY MEDICINE | Facility: CLINIC | Age: 34
End: 2024-03-15
Payer: COMMERCIAL

## 2024-03-15 VITALS
RESPIRATION RATE: 18 BRPM | TEMPERATURE: 97 F | OXYGEN SATURATION: 99 % | HEIGHT: 64 IN | SYSTOLIC BLOOD PRESSURE: 120 MMHG | DIASTOLIC BLOOD PRESSURE: 76 MMHG | BODY MASS INDEX: 24.28 KG/M2 | HEART RATE: 78 BPM | WEIGHT: 142.2 LBS

## 2024-03-15 DIAGNOSIS — Z13.29 SCREENING FOR ENDOCRINE, NUTRITIONAL, METABOLIC AND IMMUNITY DISORDER: ICD-10-CM

## 2024-03-15 DIAGNOSIS — Z11.4 SCREENING FOR HIV (HUMAN IMMUNODEFICIENCY VIRUS): ICD-10-CM

## 2024-03-15 DIAGNOSIS — Z13.228 SCREENING FOR ENDOCRINE, NUTRITIONAL, METABOLIC AND IMMUNITY DISORDER: ICD-10-CM

## 2024-03-15 DIAGNOSIS — Z13.0 SCREENING FOR ENDOCRINE, NUTRITIONAL, METABOLIC AND IMMUNITY DISORDER: ICD-10-CM

## 2024-03-15 DIAGNOSIS — Z11.59 NEED FOR HEPATITIS C SCREENING TEST: ICD-10-CM

## 2024-03-15 DIAGNOSIS — Z30.9 ENCOUNTER FOR CONTRACEPTIVE MANAGEMENT, UNSPECIFIED TYPE: ICD-10-CM

## 2024-03-15 DIAGNOSIS — M25.50 MULTIPLE JOINT PAIN: Primary | ICD-10-CM

## 2024-03-15 DIAGNOSIS — F41.1 GAD (GENERALIZED ANXIETY DISORDER): ICD-10-CM

## 2024-03-15 DIAGNOSIS — Z13.21 SCREENING FOR ENDOCRINE, NUTRITIONAL, METABOLIC AND IMMUNITY DISORDER: ICD-10-CM

## 2024-03-15 PROCEDURE — 99213 OFFICE O/P EST LOW 20 MIN: CPT | Mod: 25 | Performed by: FAMILY MEDICINE

## 2024-03-15 PROCEDURE — 99385 PREV VISIT NEW AGE 18-39: CPT | Performed by: FAMILY MEDICINE

## 2024-03-15 RX ORDER — BUPROPION HYDROCHLORIDE 150 MG/1
150 TABLET ORAL EVERY MORNING
Qty: 90 TABLET | Refills: 0 | Status: SHIPPED | OUTPATIENT
Start: 2024-03-15 | End: 2024-04-24

## 2024-03-15 RX ORDER — ULIPRISTAL ACETATE 30 MG/1
30 TABLET ORAL ONCE
Qty: 1 TABLET | Refills: 0 | Status: SHIPPED | OUTPATIENT
Start: 2024-03-15 | End: 2024-03-15

## 2024-03-15 NOTE — PROGRESS NOTES
Preventive Care Visit  [facility]  STORM FLORES MD, Family Medicine  Mar 15, 2024    Assessment & Plan     JESSIKA (generalized anxiety disorder)  - buPROPion (WELLBUTRIN XL) 150 MG 24 hr tablet  Dispense: 90 tablet; Refill: 0    Encounter for contraceptive management, unspecified type  She uses condoms. Recommended back-up when appropriate  - Ulipristal Acetate (PAM) 30 MG tablet  Dispense: 1 tablet; Refill: 0    Screening for endocrine, nutritional, metabolic and immunity disorder  - Cyclic Citrullinated Peptide Antibody IgG  - CRP, inflammation  - ESR: Erythrocyte sedimentation rate  - Anti Nuclear Radha IgG by IFA with Reflex  - Rheumatoid factor  - Comprehensive metabolic panel (BMP + Alb, Alk Phos, ALT, AST, Total. Bili, TP)  - CBC with platelets and differential  - Lipid panel reflex to direct LDL Fasting  - TSH with free T4 reflex    Screening for HIV (human immunodeficiency virus)  - HIV Antigen Antibody Combo    Need for hepatitis C screening test  - Hepatitis C Screen Reflex to HCV RNA Quant and Genotype    Will return for pap and preventative care.    Counseling  Appropriate preventive services were discussed with this patient, including applicable screening as appropriate for fall prevention, nutrition, physical activity, Tobacco-use cessation, weight loss and cognition.  Checklist reviewing preventive services available has been given to the patient.  Reviewed patient's diet, addressing concerns and/or questions.   She is at risk for lack of exercise and has been provided with information to increase physical activity for the benefit of her well-being.   The patient was instructed to see the dentist every 6 months.   Subjective   Diana is a 34 year old, presenting for the following:  Recheck Medication and Physical (Pt is here for physical )        3/15/2024     1:55 PM   Additional Questions   Roomed by winston         3/15/2024     1:55 PM   Patient Reported Additional  Medications   Patient reports taking the following new medications no      Had positive HPV and colposcopy 2 years.  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 Refinishing a dining room table on her knees and knees started hurting. No knuckle pain. Elbows can by stiff. Ankles and knees can swell. No rashes or fevers.  Has halluxis limitus.  Occasional rash on the lateral cheeks, may be sun related.  Travels for work. Lots of vegetables.   Exercise is brisk walking for work when able.  Was with a therapist, still has anxiety.           3/10/2024   General Health   How would you rate your overall physical health? Good   Feel stress (tense, anxious, or unable to sleep) Rather much   (!) STRESS CONCERN      3/10/2024   Nutrition   Three or more servings of calcium each day? (!) NO   Diet: Regular (no restrictions)   How many servings of fruit and vegetables per day? (!) 2-3   How many sweetened beverages each day? 0-1         3/10/2024   Exercise   Days per week of moderate/strenous exercise 2 days   Average minutes spent exercising at this level 30 min   (!) EXERCISE CONCERN      3/10/2024   Social Factors   Frequency of gathering with friends or relatives Once a week   Worry food won't last until get money to buy more No   Food not last or not have enough money for food? No   Do you have housing?  Yes   Are you worried about losing your housing? No   Lack of transportation? No   Unable to get utilities (heat,electricity)? No         3/10/2024   Dental   Dentist two times every year? (!) NO         3/10/2024   TB Screening   Were you born outside of [place]?  No         Today's PHQ-2 Score:       3/15/2024     1:39 PM   PHQ-2 ( 1999 [employer])   Q1: Little interest or pleasure in doing things 1   Q2: Feeling down, depressed or hopeless 0   PHQ-2 Score 1   Q1: Little interest or pleasure in doing things Several days   Q2: Feeling down, depressed or hopeless Not at all   PHQ-2 Score 1           3/10/2024   Substance Use  "  Alcohol more than 3/day or more than 7/wk No   Do you use any other substances recreationally? (!) ALCOHOL     Social History     Tobacco Use    Smoking status: Never    Smokeless tobacco: Never             3/10/2024   Breast Cancer Screening   Family history of breast, colon, or ovarian cancer? Yes         3/10/2024   LAST FHS-7 RESULTS   1st degree relative breast or ovarian cancer No   Any relative bilateral breast cancer Unknown   Any male have breast cancer No   Any ONE woman have BOTH breast AND ovarian cancer No   Any woman with breast cancer before 50yrs Yes   2 or more relatives with breast AND/OR ovarian cancer Yes   2 or more relatives with breast AND/OR bowel cancer No              3/10/2024   One time HIV Screening   Previous HIV test? Yes         3/10/2024   STI Screening   New sexual partner(s) since last STI/HIV test? (!) YES         3/10/2024   Contraception/Family Planning   Questions about contraception or family planning No        Reviewed and updated as needed this visit by Provider                     Objective    Exam  /76 (BP Location: Left arm, Patient Position: Sitting, Cuff Size: Adult Regular)   Pulse 78   Temp 97  F (36.1  C) (Tympanic)   Resp 18   Ht 1.626 m (5' 4\")   Wt 64.5 kg (142 lb 3.2 oz)   LMP 03/09/2024 (Exact Date)   SpO2 99%   BMI 24.41 kg/m     Estimated body mass index is 24.41 kg/m  as calculated from the following:    Height as of this encounter: 1.626 m (5' 4\").    Weight as of this encounter: 64.5 kg (142 lb 3.2 oz).    Physical Exam  GENERAL: healthy, alert and no distress  EYES: grossly normal to inspection, and conjunctivae and sclerae normal  RESP: breathing unlabored, no cough or throat clearing.  MS: no gross musculoskeletal defects noted.  SKIN: no suspicious lesions or rashes visible  NEURO: Normal strength and tone, mentation intact and speech normal  PSYCH: mentation appears normal, affect normal/bright   Signed Electronically by: STORM FLORES, " MD

## 2024-04-23 ENCOUNTER — VIRTUAL VISIT (OUTPATIENT)
Dept: OTOLARYNGOLOGY | Facility: CLINIC | Age: 34
End: 2024-04-23
Payer: COMMERCIAL

## 2024-04-23 DIAGNOSIS — J38.7 IRRITABLE LARYNX SYNDROME: ICD-10-CM

## 2024-04-23 DIAGNOSIS — R09.89 CHRONIC THROAT CLEARING: ICD-10-CM

## 2024-04-23 DIAGNOSIS — J38.3 PARADOXICAL VOCAL FOLD MOVEMENT: ICD-10-CM

## 2024-04-23 DIAGNOSIS — R49.0 DYSPHONIA: Primary | ICD-10-CM

## 2024-04-23 DIAGNOSIS — J38.7 LARYNGEAL HYPERFUNCTION: ICD-10-CM

## 2024-04-23 PROCEDURE — 92507 TX SP LANG VOICE COMM INDIV: CPT | Mod: GN | Performed by: SPEECH-LANGUAGE PATHOLOGIST

## 2024-04-23 SDOH — HEALTH STABILITY: PHYSICAL HEALTH: ON AVERAGE, HOW MANY DAYS PER WEEK DO YOU ENGAGE IN MODERATE TO STRENUOUS EXERCISE (LIKE A BRISK WALK)?: 3 DAYS

## 2024-04-23 SDOH — HEALTH STABILITY: PHYSICAL HEALTH: ON AVERAGE, HOW MANY MINUTES DO YOU ENGAGE IN EXERCISE AT THIS LEVEL?: 40 MIN

## 2024-04-23 ASSESSMENT — SOCIAL DETERMINANTS OF HEALTH (SDOH): HOW OFTEN DO YOU GET TOGETHER WITH FRIENDS OR RELATIVES?: THREE TIMES A WEEK

## 2024-04-23 NOTE — LETTER
4/23/2024       RE: Diana Rothman  [address] [place] [address]     Dear Colleague,    Thank you for referring your patient, Diana Rothman, to the [facility]ICE [facility] [place] at [facility]. Please see a copy of my visit note below.    Diana Rothman is a 34 year old female who is being evaluated via a billable video visit.      Diana has been notified and verbally consented to the following:   This video visit will be conducted between you and your provider.  Patient has opted to conduct today's video visit vs an in-person appointment.   Video visits are billed at different rates depending on your insurance coverage. Please reach out to your insurance provider with any questions.   If during the course of the call the provider feels the appointment is not appropriate, you will not be charged for this service.  Provider has received verbal consent for billable virtual visit from the patient? Yes  Will anyone else be joining your video visit? No    Call initiated at: 1500   Type of Visit Platform Used: [employer] Video  Location of provider: Home  Location of patient: [facility]  PROGRESS REPORT (CPT 02944)    Patient: Diana Rothman  Date of Service: 4/23/2024  Date of Last Service: 2/12/24  Number of Visits: 7  Referring Physician: Dr. Annie Rodriguez and Dr. Kassandra Ortiz    Impressions from evaluation on 7/3/23 by Dorie Manuel M.A., CCC-SLP:  This evaluation has resulted in the following diagnosis/diagnoses for Ms. 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 Laryngeal evaluation demonstrated white granulation observed by the left vocal fold process at the left medial arytenoid wall; very slight varix observed at the left vocal fold near the medial edge  "(mid fold area); Narrow Band Imaging yielded the following: apparent white granulation observed by the left vocal fold process at the left medial arytenoid wall and mild erythema observed bilaterally on the vocal folds L>R; erythema of the medial arytenoid walls; with simulated heavy breathing, anterior rocking of arytenoids was seen, however, the vocal folds remained opened; she was responsive to the rounded lip inhale, puppy sniffs, and exhaling on \"Sh\" and like blowing out candles; slight droopiness observed of the right arytenoid at rest; variable mild-moderate four-way constriction of the supraglottic larynx during connected speech. Perceptual evaluation demonstrated frequent vocal arana.    SUBJECTIVE:  Since Diana's last session, she reports the following:   She rated her throat clearing as being worse because she's been sick for the past week or so.   Everything else has felt pretty similar.  She's practiced her rescue breaths more because she's noticed her throat feeling smaller, outside of physical activity/heavier breathing. She can't tell if the rescue breaths make much difference.   She hasn't practiced her laryngeal release strategy very much, but has been more aware of her throat while yawning.   She has noticed her symptoms fall more into the PVFM column compared to lung impairment.     OBJECTIVE:  Patient Specific Goal Metrics:      2/12/2024    11:00 AM 4/23/2024     3:00 PM   Dysponia SLP Goals   How severe is your cough /throat clearing, if 0 is no cough at all and 10 is the worst cough? 5 7   How would you rate your breathing, if 0 is the worst and 10 is the best? 7 8   How much does your cough/throat-clearing problem bother you?            Quite a bit Somewhat   How much does your breathing problem bother you?         Somewhat A little bit       THERAPEUTIC ACTIVITIES  Today Diana participated in the following therapeutic activities:  Counseling and Education:  Asked questions about the " "nature of her symptoms, and I answered all of these thoroughly.    I provided Diana with explanation and skilled instruction of:  Education and exercises to promote optimal respiratory mechanics were provided:  Explanation of the anatomy and physiology of respiration for phonation; she found this to be helpful  She demonstrated excessive upper thoracic engagement during inhalation  Difficulty allowing abdominal relaxation for inhalation, and audible inhalation prior to instruction  Targeted practice of optimal breathing mechanics with patient positioned in sitting and a forward leaning position  With clinician support, patient was able to demonstrate improved abdominal relaxation and engagement on inhalation  Optimal exhalation using inward engagement of the abdominal wall with no corresponding collapse of the upper chest cavity was trained using the sustained \"sh\" task  Diana demonstrated good accuracy with moderate clinician support, including verbal explanation and visual demonstration to facilitate awareness of low respiratory engagement.   Concepts and strategies for managing laryngopharyngeal reflux disorder, to reduce laryngeal inflammation. This included education regarding the impact of reflux on the laryngeal system and management tasks including diet modification, head of bed elevation, avoidance of oral intake prior to laying down, and stress management.    Instruction of Home Practice:  A revised regimen for home practice was instructed.  I provided an AVS of important notes of today's therapeutic activities to facilitate practice.    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  She will continue to work on her exercises on a daily basis, and work on incorporating the techniques into her daily activities. Speech therapy continues to be medically necessary for Diana to participate fully and engage in activities of daily living.     Plan:   I will see Diana in 2 weeks and will plan to add instructions for activity with breathing strategies. Brief review of mucus management. Possible discharge or keep last appt    For home practice goals, see AVS.     TOTAL SERVICE TIME: 50 minutes  TREATMENT ([id number])    Virgil Oliveira (voice), M.S., CCC-SLP  Speech-Language Pathologist  [facility]  [phone]  [email or web address]  Pronouns: she/her/hers      *this report was created in part through the use of computerized dictation software, and though reviewed following completion, some typographic errors may persist.  If there is confusion regarding any of this notes contents, please contact me for clarification    OBJECTIVE FINDINGS  PATIENT REPORTED MEASURES  Patient Supplied Answers To Last 2 VHI Questionnaires      6/28/2023    10:32 AM   Voice Handicap Index (VHI-10)   My voice makes it difficult for people to hear me 0   People have difficulty understanding me in a noisy room 0   My voice difficulties restrict my personal and social life.  2   I feel left out of conversations because of my voice 0   My voice problem causes me to lose income 0   I feel as though I have to strain to produce voice 1   The clarity of my voice is unpredictable 2   My voice problem upsets me 2   My voice makes me feel handicapped 0   People ask, \"What's wrong with your voice?\" 1   VHI-10 8          Patient Supplied Answers To Last 2 CSI Questionnaires      2/12/2024    10:49 AM 4/23/2024     2:48 PM   Cough Severity Index (CSI)   My cough is worse when I lie down 1 1   My coughing problem causes me to restrict my personal and social life 0 0 "   I tend to avoid places because of my cough problem 0 0   I feel embarrassed because of my coughing problem 2 2   People ask, ''What's wrong?'' because I cough a lot 2 2   I run out of air when I cough 0 0   My coughing problem affects my voice 2 2   My coughing problem limits my physical activity 0 0   My coughing problem upsets me 2 2   People ask me if I am sick because I cough a lot 2 2   CSI Score 11 11          Patient Supplied Answers To Last 2 EAT Questionnaires       No data to display                  Patient Supplied Answers to Dyspnea Index Questionnaire:      4/23/2024     2:48 PM   Dyspnea Index   1. I have trouble getting air in. 2   2. I feel tightness in my throat when I am having jayme breathing problem. 3   3. It takes more effort to breathe than it used to. 1   4. Change in weather affect my breathing problem. 1   5. My breathing gets worse with stress. 1   6. I make sound/noise breathing in 2   7. I have to strain to breathe. 2   8. My shortness of breath gets worse with exercise or physical activity 4   9. My breathing problem makes me feel stressed. 2   10. My breathing problem casuses me to restrict my personal and social life. 0   Dyspnea Index Total Score 18          Again, thank you for allowing me to participate in the care of your patient.      Sincerely,    Lizz Medina, SLP

## 2024-04-23 NOTE — PATIENT INSTRUCTIONS
"Hello!     It was a pleasure to see you today. Please complete the exercises below and remember, a few minutes of practice many times throughout the day is more important than one large practice session. If you have any questions about your strategies, feel free to contact me at [email or web address] or via [employer]. Please call [phone] for scheduling alterations or to be seen sooner in case of cancellations.      If your appointment is virtual, please remember to logon to [employer] a few minutes early to complete the questionnaires before your visit starts.      Home Exercise Program:  BELLY BREATHING  Stretch: (each morning)  Stretch your arms up and take two slow, deep breaths, feeling your rib cage lift and stretch.    Now lean toward each side, taking 2 slow, deep breaths on each side, feeling your ribcage lift and expand along the sides.   Now leaning back, arching your back to feel a stretch along the front of your ribs, and take 2 more slow, deep breaths. Really let the abdomen expand fully.  Lastly, lean forward so that your head, neck, and arms are completely slack toward the ground. Hold for 30 seconds and allow your back and neck to stretch and take several slow, deep breaths. You should be able to feel a bit of expansion between your spine and shoulder blades as you inhale, then feel the shoulder blades pull back inward on exhalation.      Practice: (5 breaths per hour or 10 breaths at each meal)  Sit hinged forward with your elbows on your knees. OR Sit/stand normally   Decide whether you prefer to place your hands on either side of your belly and back or low ribs, or one of each.  If someone is nearby to help, they can place their hands on the sides of your back and ribs.   Slowly breathe in and feel your belly and back expand, like filling a balloon, pushing out against your waistband  Slowly breathe out (on \"Shhhhhh\") and feel your belly and back crunch inward, like zipping tight " "pants  Repeat slowly and take breaks if you get dizzy    HELPFUL HINTS:  -- Allow yourself 2% of a slouch or crunch. [place] or wings.  -- Tie a string or piece of elastic around your low ribs when you get dressed. You'll be able to feel yourself expand against this as you breathe all day.   -- Some people find it easiest to practice while laying on their back or in a recliner, hands on their belly.  -- You can also roll onto your stomach, resting your arms above your head, in order to feel more back expansion.   ____________________________________________________    RESCUE BREATHS (4-5 breaths, 2-3 sets/day and IF SHORT OF BREATH)  -- Use a mirror if it helps to see your mouth and lips  -- Remember to focus on SILENT inhalation every time  -- Practice these daily when you're relaxed and focus on correct technique. The more you practice, the easier these become when you are not relaxed.  STRAW SIP & BULLFROG: Round your lips like a milkshake straw and inhale, feeling cool & silent air sweep through the center of your mouth. Exhale with pursed lips and ballooned or bullfrogged cheeks and throat, or on a \"shhhh\", \"sh sh sh sh sh...\", or \"ffffff\", whichever feels better.   PUPPY YAWN & BULLFROG: Let your tongue relax flat against your bottom lip like a puppy, keeping your lips and jaw open. Silently inhale with a cool, yawny sensation. Next, exhale with pursed lips and ballooned or bullfrogged cheeks and throat, or on a \"shhhh\", \"sh sh sh sh sh...\", or \"ffffff\", whichever feels better. Blow more gently and slower if you feel dizzy.  TACO TONGUE & BULLFROG: If you can, curl your tongue slightly like a hard taco shell, keeping your lips and jaw open. Silently inhale with a cool, yawny sensation. Next, exhale with pursed lips and ballooned or bullfrogged cheeks and throat, or on a \"shhhh\", \"sh sh sh sh sh...\", or \"ffffff\", whichever feels better.  Blow more gently and slower if you feel dizzy.  EDVIN or \"Hand Breath\": " "Place an ice-cream cone shaped hand against open, rounded lips with your tongue flat in your mouth. Slowly inhale and feel a silent, low breath sweep in and down your throat. Now tighten your hand to a fist and gently blow out, feeling a ballooning stretch through your cheeks and throat. Avoid blowing so hard that you feel strained.   TONGUE TUCK: Place the tip of your tongue against the roof of your mouth, creating a column in the center. Breathe in and feel cool & silent air sweep in along the sides of your tongue and down the back of your throat. Now keep your tongue tip in place and simply exhale.   This strategy will make your mouth dry, so stop and gently nibble the sides of your tongue with your molars. This tricks your brain into making your mouth water and move some saliva around with your tongue whenever needed, then return to your strategy.   The tongue tuck is easiest to learn and start applying to movement, but doesn't open the vocal folds as forcefully as some other strategies.   This works really well when wearing a mask to make the air flow feel more natural.  SNIFF (Sniff) & BULLFROG: Decide whether you prefer one long, gentle sniff in through your nose or 2-3 short, quick sniffs. Exhale with pursed lips and ballooned or bullfrogged cheeks and throat, or on a \"shhhh\", \"sh sh sh sh sh...\", or \"ffffff\", whichever feels better. Blow more gently and slower if you feel dizzy.     ____________________________________________________     LARYNGEAL RELEASE  Learning Tasks (only as needed)  1. Place your finger tips along the center of your throat/Alberto's apple  2. Swallow and notice your voice box move up and back down  3. Compare a yawn, high-pitch EE (voice box goes up slightly) with a silent, yawny sigh on \"Huh\" like the word \"hug\" (voice box goes down slightly), baby gorilla, donkey     EXERCISES (5 breaths/hour- looking in a mirror)   1. Drop your mouth OPEN- WATCH IN A MIRROR/REVERSED CAMERA like the " "word \"bleh\" with your tongue relaxed forward and flat. BACK of the tongue needs to be down.  2. Slowly inhale with a SILENT, yawny breath, feeling cool and quiet air flow in across your tongue and far down the back of your throat.   3. See if you willie's apple drops ever so slightly as you inhale with your SILENT uh feeling.   4. Repeat slowly 5 times in a row (slowly so you don't get dizzy)  ____________________________________________________             ____________________________________________________    REFLUX MANAGEMENT (ongoing as needed)  -- When reflux comes up the wrong way from your stomach and spills into your throat, you may not feel it come up if the acidity levels are low. However, liquid spilling onto the vocal cords can cause lots of throat irritation, cough and throat clearing, hoarseness, and even difficulty breathing.   1. Elevate the head of your bed about 4 inches, trying to keep the mattress straight to avoid any back or neck aches. If you decide to look at the foam wedges, you can search for \"mattress wedge elevator foam\" and find a 5\". Some brands Avana, BeautyRest, Lucid, etc.   2. Avoid eating/drinking close to when you lay down, about 2-3 hours. The less there is in your stomach, the less can spill back up during the night. Your empty stomach is about the size of your fist. If you get thirsty, take a tiny sip, swish it, gargle it, then swallow so you don't overfill your stomach.  3. Try to eat foods with a high PH level (less acidic). Possible reflux triggers include spicy foods, acidic foods, and foods that may relax the esophageal sphincters and allow back-splash, such as raw onions, garlic, or peppers, store-bought tomato sauces/salsas, pizza, greasy foods, orange or lemon juice, chocolate, caffeine, carbonation, alcohol, mints and menthol. Try to avoid these foods for about a month so your throat has time to heal and calm down, then reintroduce 1 food every 3-4 days, using a " "notebook to track any increase in symptoms to figure out which foods are your trigger foods.   4. Avoid putting pressure on your stomach by slouching, wearing tight clothes, or carrying extra weight that pushes on your stomach.   5. Actively reduce stress. Stress can cause a fight or flight response in your body, even if you feel like you're able to handle stress. Try deep breathing, yoga, or mindfulness meditation to help calm your nervous system (Calm, Headspace, Yoga with Cindy on youtube, etc). Square Breathing is an easy to use stress management tool: Inhale for 4 seconds, hold your breath for 4 seconds, exhale for 4 seconds, and hold your breath for 4 seconds. Repeat at least 4 times.   6. You could consider sipping, gargling, and swallowing about 6 oz of alkaline water in the evening, which helps neutralize any acidity coming up to your throat. Keep in mind that alkaline water looses it's alkaline properties the longer it has been bottled, so check the bottling dates or find a store like [employer] where you can fill your own container of freshly alkalized water.   7. You can try Reflux Gourmet (available online). This is algae based and creates a physical barrier along the surface of your stomach content so it can't come back up.   8. You could check out the book \"Acid Watcher Diet\" by Dr. Jaspal Kumar.     Thank you and have a great day!  Terry Kirkland. (voice) M.S., CCC-SLP  Speech-Language Pathologist  [facility]  [phone]  [email or web address]  Pronouns: she/her/hers  "

## 2024-04-23 NOTE — PROGRESS NOTES
Diana Rothman is a 34 year old female who is being evaluated via a billable video visit.      Diana has been notified and verbally consented to the following:   This video visit will be conducted between you and your provider.  Patient has opted to conduct today's video visit vs an in-person appointment.   Video visits are billed at different rates depending on your insurance coverage. Please reach out to your insurance provider with any questions.   If during the course of the call the provider feels the appointment is not appropriate, you will not be charged for this service.  Provider has received verbal consent for billable virtual visit from the patient? Yes  Will anyone else be joining your video visit? No    Call initiated at: 1500   Type of Visit Platform Used: Get[employer] Video  Location of provider: Home  Location of patient: [facility]  PROGRESS REPORT (CPT 12885)    Patient: Diana Rothman  Date of Service: 4/23/2024  Date of Last Service: 2/12/24  Number of Visits: 7  Referring Physician: Dr. Annie Rodriguez and Dr. Kassandra Ortiz    Impressions from evaluation on 7/3/23 by Dorie Manuel M.A., CCC-SLP:  This evaluation has resulted in the following diagnosis/diagnoses for Ms. 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 Laryngeal evaluation demonstrated white granulation observed by the left vocal fold process at the left medial arytenoid wall; very slight varix observed at the left vocal fold near the medial edge (mid fold area); Narrow Band Imaging yielded the following: apparent white granulation observed by the left vocal fold process at the left medial arytenoid wall and mild erythema observed bilaterally on the vocal folds L>R; erythema of the medial arytenoid walls; with simulated heavy breathing, anterior rocking of  "arytenoids was seen, however, the vocal folds remained opened; she was responsive to the rounded lip inhale, puppy sniffs, and exhaling on \"Sh\" and like blowing out candles; slight droopiness observed of the right arytenoid at rest; variable mild-moderate four-way constriction of the supraglottic larynx during connected speech. Perceptual evaluation demonstrated frequent vocal arana.    SUBJECTIVE:  Since Diana's last session, she reports the following:   She rated her throat clearing as being worse because she's been sick for the past week or so.   Everything else has felt pretty similar.  She's practiced her rescue breaths more because she's noticed her throat feeling smaller, outside of physical activity/heavier breathing. She can't tell if the rescue breaths make much difference.   She hasn't practiced her laryngeal release strategy very much, but has been more aware of her throat while yawning.   She has noticed her symptoms fall more into the PVFM column compared to lung impairment.     OBJECTIVE:  Patient Specific Goal Metrics:      2/12/2024    11:00 AM 4/23/2024     3:00 PM   Dysponia SLP Goals   How severe is your cough /throat clearing, if 0 is no cough at all and 10 is the worst cough? 5 7   How would you rate your breathing, if 0 is the worst and 10 is the best? 7 8   How much does your cough/throat-clearing problem bother you?            Quite a bit Somewhat   How much does your breathing problem bother you?         Somewhat A little bit       THERAPEUTIC ACTIVITIES  Today Diana participated in the following therapeutic activities:  Counseling and Education:  Asked questions about the nature of her symptoms, and I answered all of these thoroughly.    I provided Diana with explanation and skilled instruction of:  Education and exercises to promote optimal respiratory mechanics were provided:  Explanation of the anatomy and physiology of respiration for phonation; she found this to be helpful  She " "demonstrated excessive upper thoracic engagement during inhalation  Difficulty allowing abdominal relaxation for inhalation, and audible inhalation prior to instruction  Targeted practice of optimal breathing mechanics with patient positioned in sitting and a forward leaning position  With clinician support, patient was able to demonstrate improved abdominal relaxation and engagement on inhalation  Optimal exhalation using inward engagement of the abdominal wall with no corresponding collapse of the upper chest cavity was trained using the sustained \"sh\" task  Diana demonstrated good accuracy with moderate clinician support, including verbal explanation and visual demonstration to facilitate awareness of low respiratory engagement.   Concepts and strategies for managing laryngopharyngeal reflux disorder, to reduce laryngeal inflammation. This included education regarding the impact of reflux on the laryngeal system and management tasks including diet modification, head of bed elevation, avoidance of oral intake prior to laying down, and stress management.    Instruction of Home Practice:  A revised regimen for home practice was instructed.  I provided an AVS of important notes of today's therapeutic activities to facilitate practice.    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  She will continue to work on her exercises on a daily basis, and work on incorporating the techniques into her daily activities. Speech therapy continues to be medically necessary for Diana to participate fully and engage in activities of daily living.     Plan: " "  I will see Diana in 2 weeks and will plan to add instructions for activity with breathing strategies. Brief review of mucus management. Possible discharge or keep last appt    For home practice goals, see AVS.     TOTAL SERVICE TIME: 50 minutes  TREATMENT (78347)    Virgil Oliveira (voice), M.S., CCC-SLP  Speech-Language Pathologist  [facility]  [phone]  [email or web address]  Pronouns: she/her/hers      *this report was created in part through the use of computerized dictation software, and though reviewed following completion, some typographic errors may persist.  If there is confusion regarding any of this notes contents, please contact me for clarification    OBJECTIVE FINDINGS  PATIENT REPORTED MEASURES  Patient Supplied Answers To Last 2 VHI Questionnaires      6/28/2023    10:32 AM   Voice Handicap Index (VHI-10)   My voice makes it difficult for people to hear me 0   People have difficulty understanding me in a noisy room 0   My voice difficulties restrict my personal and social life.  2   I feel left out of conversations because of my voice 0   My voice problem causes me to lose income 0   I feel as though I have to strain to produce voice 1   The clarity of my voice is unpredictable 2   My voice problem upsets me 2   My voice makes me feel handicapped 0   People ask, \"What's wrong with your voice?\" 1   VHI-10 8          Patient Supplied Answers To Last 2 CSI Questionnaires      2/12/2024    10:49 AM 4/23/2024     2:48 PM   Cough Severity Index (CSI)   My cough is worse when I lie down 1 1   My coughing problem causes me to restrict my personal and social life 0 0   I tend to avoid places because of my cough problem 0 0   I feel embarrassed because of my coughing problem 2 2   People ask, ''What's wrong?'' because I cough a lot 2 2   I run out of air when I cough 0 0   My coughing problem affects my voice 2 2   My coughing problem limits my physical activity 0 0   My coughing " problem upsets me 2 2   People ask me if I am sick because I cough a lot 2 2   CSI Score 11 11          Patient Supplied Answers To Last 2 EAT Questionnaires       No data to display                  Patient Supplied Answers to Dyspnea Index Questionnaire:      4/23/2024     2:48 PM   Dyspnea Index   1. I have trouble getting air in. 2   2. I feel tightness in my throat when I am having jayme breathing problem. 3   3. It takes more effort to breathe than it used to. 1   4. Change in weather affect my breathing problem. 1   5. My breathing gets worse with stress. 1   6. I make sound/noise breathing in 2   7. I have to strain to breathe. 2   8. My shortness of breath gets worse with exercise or physical activity 4   9. My breathing problem makes me feel stressed. 2   10. My breathing problem casuses me to restrict my personal and social life. 0   Dyspnea Index Total Score 18

## 2024-04-24 ENCOUNTER — OFFICE VISIT (OUTPATIENT)
Dept: FAMILY MEDICINE | Facility: CLINIC | Age: 34
End: 2024-04-24
Payer: COMMERCIAL

## 2024-04-24 VITALS
OXYGEN SATURATION: 99 % | HEIGHT: 64 IN | SYSTOLIC BLOOD PRESSURE: 113 MMHG | HEART RATE: 79 BPM | BODY MASS INDEX: 23.22 KG/M2 | DIASTOLIC BLOOD PRESSURE: 79 MMHG | WEIGHT: 136 LBS | TEMPERATURE: 97.3 F | RESPIRATION RATE: 14 BRPM

## 2024-04-24 DIAGNOSIS — Z00.00 ROUTINE GENERAL MEDICAL EXAMINATION AT A HEALTH CARE FACILITY: ICD-10-CM

## 2024-04-24 DIAGNOSIS — Z01.419 ENCOUNTER FOR GYNECOLOGICAL EXAMINATION WITHOUT ABNORMAL FINDING: Primary | ICD-10-CM

## 2024-04-24 DIAGNOSIS — F41.1 GAD (GENERALIZED ANXIETY DISORDER): ICD-10-CM

## 2024-04-24 DIAGNOSIS — R87.610 PAPANICOLAOU SMEAR OF CERVIX WITH ATYPICAL SQUAMOUS CELLS OF UNDETERMINED SIGNIFICANCE (ASC-US): ICD-10-CM

## 2024-04-24 DIAGNOSIS — R87.610 ASCUS WITH POSITIVE HIGH RISK HPV CERVICAL: ICD-10-CM

## 2024-04-24 DIAGNOSIS — R87.810 ASCUS WITH POSITIVE HIGH RISK HPV CERVICAL: ICD-10-CM

## 2024-04-24 DIAGNOSIS — Z11.3 SCREEN FOR STD (SEXUALLY TRANSMITTED DISEASE): ICD-10-CM

## 2024-04-24 PROCEDURE — 90480 ADMN SARSCOV2 VAC 1/ONLY CMP: CPT | Performed by: FAMILY MEDICINE

## 2024-04-24 PROCEDURE — 90686 IIV4 VACC NO PRSV 0.5 ML IM: CPT | Performed by: FAMILY MEDICINE

## 2024-04-24 PROCEDURE — 87491 CHLMYD TRACH DNA AMP PROBE: CPT | Performed by: FAMILY MEDICINE

## 2024-04-24 PROCEDURE — 91320 SARSCV2 VAC 30MCG TRS-SUC IM: CPT | Performed by: FAMILY MEDICINE

## 2024-04-24 PROCEDURE — 99395 PREV VISIT EST AGE 18-39: CPT | Mod: 25 | Performed by: FAMILY MEDICINE

## 2024-04-24 PROCEDURE — G0145 SCR C/V CYTO,THINLAYER,RESCR: HCPCS | Performed by: FAMILY MEDICINE

## 2024-04-24 PROCEDURE — G0124 SCREEN C/V THIN LAYER BY MD: HCPCS | Performed by: PATHOLOGY

## 2024-04-24 PROCEDURE — 87624 HPV HI-RISK TYP POOLED RSLT: CPT | Performed by: FAMILY MEDICINE

## 2024-04-24 PROCEDURE — 87591 N.GONORRHOEAE DNA AMP PROB: CPT | Performed by: FAMILY MEDICINE

## 2024-04-24 PROCEDURE — 90471 IMMUNIZATION ADMIN: CPT | Performed by: FAMILY MEDICINE

## 2024-04-24 RX ORDER — BUPROPION HYDROCHLORIDE 150 MG/1
300 TABLET ORAL EVERY MORNING
Qty: 90 TABLET | Refills: 1 | Status: SHIPPED | OUTPATIENT
Start: 2024-04-24 | End: 2024-06-18

## 2024-04-24 ASSESSMENT — PAIN SCALES - GENERAL: PAINLEVEL: NO PAIN (0)

## 2024-04-24 NOTE — PROGRESS NOTES
Preventive Care Visit  [facility]  STORM FLORES MD, Family Medicine  Apr 24, 2024    Assessment & Plan   Encounter for gynecological examination without abnormal finding  - Pap screen with HPV - recommended age 30 - 65 years  - HPV Hold (Lab Only)    Routine general medical examination at a health care facility  Discussed diet, exercise and alcohol limitations recommended.    JESSIKA (generalized anxiety disorder)  - buPROPion (WELLBUTRIN XL) 150 MG 24 hr tablet  Dispense: 90 tablet; Refill: 1    Screen for STD (sexually transmitted disease)  - HIV Antigen Antibody Combo  - Treponema Abs w Reflex to RPR and Titer  - Chlamydia trachomatis/Neisseria gonorrhoeae by PCR - Clinic Collect    Papanicolaou smear of cervix with atypical squamous cells of undetermined significance (ASC-US)  - Pap screen with HPV - recommended age 30 - 65 years  - HPV Hold (Lab Only)    ASCUS with positive high risk HPV cervical    Recheck in six weeks.    Counseling  Appropriate preventive services were discussed with this patient, including applicable screening as appropriate for fall prevention, nutrition, physical activity, Tobacco-use cessation, weight loss and cognition.  Checklist reviewing preventive services available has been given to the patient.  Reviewed patient's diet, addressing concerns and/or questions.   She is at risk for lack of exercise and has been provided with information to increase physical activity for the benefit of her well-being.   The patient was instructed to see the dentist every 6 months.   Humera Ortiz is a 34 year old, presenting for the following:  No chief complaint on file.        4/24/2024     9:34 AM   Additional Questions   Roomed by Sally WALTON      Health Care Directive  Patient does not have a Health Care Directive or Living Will: Discussed advance care planning with patient; information given to patient to review.    HPI  No changes good or bad with the bupropion.  Has not  gotten her labs yet. Wanting to wait for more inflammation.      4/23/2024   General Health   How would you rate your overall physical health? Good   Feel stress (tense, anxious, or unable to sleep) Rather much   (!) STRESS CONCERN      4/23/2024   Nutrition   Three or more servings of calcium each day? (!) NO   Diet: Regular (no restrictions)   How many servings of fruit and vegetables per day? (!) 2-3   How many sweetened beverages each day? 0-1         4/23/2024   Exercise   Days per week of moderate/strenous exercise 3 days   Average minutes spent exercising at this level 40 min         4/23/2024   Social Factors   Frequency of gathering with friends or relatives Three times a week   Worry food won't last until get money to buy more No   Food not last or not have enough money for food? No   Do you have housing?  Yes   Are you worried about losing your housing? No   Lack of transportation? No   Unable to get utilities (heat,electricity)? No         4/23/2024   Dental   Dentist two times every year? (!) NO         4/23/2024   TB Screening   Were you born outside of the [place]? No           Today's PHQ-2 Score:       3/15/2024     1:39 PM   PHQ-2 ( 1999 [employer])   Q1: Little interest or pleasure in doing things 1   Q2: Feeling down, depressed or hopeless 0   PHQ-2 Score 1   Q1: Little interest or pleasure in doing things Several days   Q2: Feeling down, depressed or hopeless Not at all   PHQ-2 Score 1         4/23/2024   Substance Use   Alcohol more than 3/day or more than 7/wk No   Do you use any other substances recreationally? (!) ALCOHOL     Social History     Tobacco Use    Smoking status: Never    Smokeless tobacco: Never           3/10/2024   LAST FHS-7 RESULTS   1st degree relative breast or ovarian cancer No   Any relative bilateral breast cancer Unknown   Any male have breast cancer No   Any ONE woman have BOTH breast AND ovarian cancer No   Any woman with breast cancer before 50yrs Yes   2 or more relatives  "with breast AND/OR ovarian cancer Yes   2 or more relatives with breast AND/OR bowel cancer No        Mammogram Screening - Patient under 40 years of age: Routine Mammogram Screening not recommended.           4/23/2024   One time HIV Screening   Previous HIV test? Yes         4/23/2024   STI Screening   New sexual partner(s) since last STI/HIV test? (!) YES      History of abnormal Pap smear: YES - updated in Problem List and Health Maintenance accordingly             4/23/2024   Contraception/Family Planning   Questions about contraception or family planning No        Reviewed and updated as needed this visit by Provider                    History reviewed. No pertinent past medical history.  History reviewed. No pertinent surgical history.  OB History   No obstetric history on file.     Current Outpatient Medications   Medication Sig Dispense Refill    buPROPion (WELLBUTRIN XL) 150 MG 24 hr tablet Take 2 tablets (300 mg) by mouth every morning 90 tablet 1    Ulipristal Acetate (PAM) 30 MG tablet Take 1 tablet (30 mg) by mouth once for 1 dose 1 tablet 0     Allergies   Allergen Reactions    Terbinafine Rash         Review of Systems  Constitutional, HEENT, cardiovascular, pulmonary, GI, , musculoskeletal, neuro, skin, endocrine and psych systems are negative, except as otherwise noted.     Objective    Exam  /79 (BP Location: Left arm, Patient Position: Sitting, Cuff Size: Adult Regular)   Pulse 79   Temp 97.3  F (36.3  C) (Tympanic)   Resp 14   Ht 1.626 m (5' 4\")   Wt 61.7 kg (136 lb)   LMP 04/14/2024 (Exact Date)   SpO2 99%   BMI 23.34 kg/m     Estimated body mass index is 23.34 kg/m  as calculated from the following:    Height as of this encounter: 1.626 m (5' 4\").    Weight as of this encounter: 61.7 kg (136 lb).    Physical Exam  GENERAL: alert and no distress  EYES: Eyes grossly normal to inspection, PERRL and conjunctivae and sclerae normal  HENT: ear canals and TM's normal, nose and mouth " without ulcers or lesions  NECK: no adenopathy, no asymmetry, masses, or scars  RESP: lungs clear to auscultation - no rales, rhonchi or wheezes  BREAST: normal without masses, tenderness or nipple discharge and no palpable axillary masses or adenopathy  CV: regular rate and rhythm, normal S1 S2, no S3 or S4, no murmur, click or rub, no peripheral edema  ABDOMEN: soft, nontender, no hepatosplenomegaly, no masses and bowel sounds normal   (female) w/bimanual: normal female external genitalia, normal urethral meatus, normal vaginal mucosa, and normal cervix/adnexa/uterus without masses or discharge  MS: no gross musculoskeletal defects noted, no edema  SKIN: no suspicious lesions or rashes  NEURO: Normal strength and tone, mentation intact and speech normal  PSYCH: mentation appears normal, affect normal/bright        Signed Electronically by: STORM FLORES MD

## 2024-04-25 LAB
C TRACH DNA SPEC QL PROBE+SIG AMP: NEGATIVE
N GONORRHOEA DNA SPEC QL NAA+PROBE: NEGATIVE

## 2024-04-29 LAB
BKR LAB AP GYN ADEQUACY: ABNORMAL
BKR LAB AP GYN INTERPRETATION: ABNORMAL
BKR LAB AP HPV REFLEX: ABNORMAL
BKR LAB AP LMP: ABNORMAL
BKR LAB AP PREVIOUS ABNORMAL: ABNORMAL
PATH REPORT.COMMENTS IMP SPEC: ABNORMAL
PATH REPORT.COMMENTS IMP SPEC: ABNORMAL
PATH REPORT.RELEVANT HX SPEC: ABNORMAL

## 2024-05-01 LAB
HUMAN PAPILLOMA VIRUS 16 DNA: NEGATIVE
HUMAN PAPILLOMA VIRUS 18 DNA: NEGATIVE
HUMAN PAPILLOMA VIRUS FINAL DIAGNOSIS: ABNORMAL
HUMAN PAPILLOMA VIRUS OTHER HR: POSITIVE

## 2024-05-02 ENCOUNTER — PATIENT OUTREACH (OUTPATIENT)
Dept: FAMILY MEDICINE | Facility: CLINIC | Age: 34
End: 2024-05-02
Payer: COMMERCIAL

## 2024-05-02 DIAGNOSIS — R87.810 CERVICAL HIGH RISK HPV (HUMAN PAPILLOMAVIRUS) TEST POSITIVE: Primary | ICD-10-CM

## 2024-05-02 PROBLEM — R87.612 PAPANICOLAOU SMEAR OF CERVIX WITH LOW GRADE SQUAMOUS INTRAEPITHELIAL LESION (LGSIL): Status: ACTIVE | Noted: 2024-05-02

## 2024-05-21 ENCOUNTER — LAB (OUTPATIENT)
Dept: LAB | Facility: CLINIC | Age: 34
End: 2024-05-21
Payer: COMMERCIAL

## 2024-05-21 DIAGNOSIS — Z13.228 SCREENING FOR ENDOCRINE, NUTRITIONAL, METABOLIC AND IMMUNITY DISORDER: ICD-10-CM

## 2024-05-21 DIAGNOSIS — Z13.29 SCREENING FOR ENDOCRINE, NUTRITIONAL, METABOLIC AND IMMUNITY DISORDER: ICD-10-CM

## 2024-05-21 DIAGNOSIS — Z11.3 SCREEN FOR STD (SEXUALLY TRANSMITTED DISEASE): ICD-10-CM

## 2024-05-21 DIAGNOSIS — Z11.4 SCREENING FOR HIV (HUMAN IMMUNODEFICIENCY VIRUS): ICD-10-CM

## 2024-05-21 DIAGNOSIS — Z13.21 SCREENING FOR ENDOCRINE, NUTRITIONAL, METABOLIC AND IMMUNITY DISORDER: ICD-10-CM

## 2024-05-21 DIAGNOSIS — Z11.59 NEED FOR HEPATITIS C SCREENING TEST: ICD-10-CM

## 2024-05-21 DIAGNOSIS — Z13.0 SCREENING FOR ENDOCRINE, NUTRITIONAL, METABOLIC AND IMMUNITY DISORDER: ICD-10-CM

## 2024-05-21 LAB
ALBUMIN SERPL BCG-MCNC: 4.3 G/DL (ref 3.5–5.2)
ALP SERPL-CCNC: 64 U/L (ref 40–150)
ALT SERPL W P-5'-P-CCNC: 20 U/L (ref 0–50)
ANION GAP SERPL CALCULATED.3IONS-SCNC: 10 MMOL/L (ref 7–15)
AST SERPL W P-5'-P-CCNC: 29 U/L (ref 0–45)
BASOPHILS # BLD AUTO: 0.1 10E3/UL (ref 0–0.2)
BASOPHILS NFR BLD AUTO: 1 %
BILIRUB SERPL-MCNC: 0.4 MG/DL
BUN SERPL-MCNC: 6.8 MG/DL (ref 6–20)
CALCIUM SERPL-MCNC: 9.2 MG/DL (ref 8.6–10)
CHLORIDE SERPL-SCNC: 104 MMOL/L (ref 98–107)
CHOLEST SERPL-MCNC: 163 MG/DL
CREAT SERPL-MCNC: 0.91 MG/DL (ref 0.51–0.95)
CRP SERPL-MCNC: <3 MG/L
DEPRECATED HCO3 PLAS-SCNC: 28 MMOL/L (ref 22–29)
EGFRCR SERPLBLD CKD-EPI 2021: 84 ML/MIN/1.73M2
EOSINOPHIL # BLD AUTO: 0.2 10E3/UL (ref 0–0.7)
EOSINOPHIL NFR BLD AUTO: 4 %
ERYTHROCYTE [DISTWIDTH] IN BLOOD BY AUTOMATED COUNT: 11.7 % (ref 10–15)
ERYTHROCYTE [SEDIMENTATION RATE] IN BLOOD BY WESTERGREN METHOD: 4 MM/HR (ref 0–20)
FASTING STATUS PATIENT QL REPORTED: YES
FASTING STATUS PATIENT QL REPORTED: YES
GLUCOSE SERPL-MCNC: 86 MG/DL (ref 70–99)
HCT VFR BLD AUTO: 40.3 % (ref 35–47)
HCV AB SERPL QL IA: NONREACTIVE
HDLC SERPL-MCNC: 65 MG/DL
HGB BLD-MCNC: 13.3 G/DL (ref 11.7–15.7)
HIV 1+2 AB+HIV1 P24 AG SERPL QL IA: NONREACTIVE
IMM GRANULOCYTES # BLD: 0 10E3/UL
IMM GRANULOCYTES NFR BLD: 0 %
LDLC SERPL CALC-MCNC: 91 MG/DL
LYMPHOCYTES # BLD AUTO: 1.7 10E3/UL (ref 0.8–5.3)
LYMPHOCYTES NFR BLD AUTO: 41 %
MCH RBC QN AUTO: 29 PG (ref 26.5–33)
MCHC RBC AUTO-ENTMCNC: 33 G/DL (ref 31.5–36.5)
MCV RBC AUTO: 88 FL (ref 78–100)
MONOCYTES # BLD AUTO: 0.4 10E3/UL (ref 0–1.3)
MONOCYTES NFR BLD AUTO: 9 %
NEUTROPHILS # BLD AUTO: 1.9 10E3/UL (ref 1.6–8.3)
NEUTROPHILS NFR BLD AUTO: 45 %
NONHDLC SERPL-MCNC: 98 MG/DL
PLATELET # BLD AUTO: 277 10E3/UL (ref 150–450)
POTASSIUM SERPL-SCNC: 4.2 MMOL/L (ref 3.4–5.3)
PROT SERPL-MCNC: 6.4 G/DL (ref 6.4–8.3)
RBC # BLD AUTO: 4.58 10E6/UL (ref 3.8–5.2)
RHEUMATOID FACT SERPL-ACNC: <10 IU/ML
SODIUM SERPL-SCNC: 142 MMOL/L (ref 135–145)
T PALLIDUM AB SER QL: NONREACTIVE
TRIGL SERPL-MCNC: 35 MG/DL
TSH SERPL DL<=0.005 MIU/L-ACNC: 1.18 UIU/ML (ref 0.3–4.2)
WBC # BLD AUTO: 4.2 10E3/UL (ref 4–11)

## 2024-05-21 PROCEDURE — 86780 TREPONEMA PALLIDUM: CPT

## 2024-05-21 PROCEDURE — 85652 RBC SED RATE AUTOMATED: CPT

## 2024-05-21 PROCEDURE — 80053 COMPREHEN METABOLIC PANEL: CPT

## 2024-05-21 PROCEDURE — 86038 ANTINUCLEAR ANTIBODIES: CPT

## 2024-05-21 PROCEDURE — 85025 COMPLETE CBC W/AUTO DIFF WBC: CPT

## 2024-05-21 PROCEDURE — 86200 CCP ANTIBODY: CPT

## 2024-05-21 PROCEDURE — 80061 LIPID PANEL: CPT

## 2024-05-21 PROCEDURE — 84443 ASSAY THYROID STIM HORMONE: CPT

## 2024-05-21 PROCEDURE — 86140 C-REACTIVE PROTEIN: CPT

## 2024-05-21 PROCEDURE — 86039 ANTINUCLEAR ANTIBODIES (ANA): CPT

## 2024-05-21 PROCEDURE — 36415 COLL VENOUS BLD VENIPUNCTURE: CPT

## 2024-05-21 PROCEDURE — 86431 RHEUMATOID FACTOR QUANT: CPT

## 2024-05-21 PROCEDURE — 86803 HEPATITIS C AB TEST: CPT

## 2024-05-21 PROCEDURE — 87389 HIV-1 AG W/HIV-1&-2 AB AG IA: CPT

## 2024-05-22 LAB
ANA PAT SER IF-IMP: ABNORMAL
ANA SER QL IF: POSITIVE
ANA TITR SER IF: ABNORMAL {TITER}
CCP AB SER IA-ACNC: 0.7 U/ML

## 2024-06-18 DIAGNOSIS — F41.1 GAD (GENERALIZED ANXIETY DISORDER): ICD-10-CM

## 2024-06-18 RX ORDER — BUPROPION HYDROCHLORIDE 150 MG/1
300 TABLET ORAL EVERY MORNING
Qty: 180 TABLET | Refills: 2 | Status: SHIPPED | OUTPATIENT
Start: 2024-06-18

## 2024-10-10 ENCOUNTER — OFFICE VISIT (OUTPATIENT)
Dept: FAMILY MEDICINE | Facility: CLINIC | Age: 34
End: 2024-10-10
Payer: COMMERCIAL

## 2024-10-10 VITALS
SYSTOLIC BLOOD PRESSURE: 118 MMHG | OXYGEN SATURATION: 98 % | HEART RATE: 78 BPM | TEMPERATURE: 97.7 F | DIASTOLIC BLOOD PRESSURE: 69 MMHG

## 2024-10-10 DIAGNOSIS — B86 SCABIES: Primary | ICD-10-CM

## 2024-10-10 PROCEDURE — 99213 OFFICE O/P EST LOW 20 MIN: CPT | Performed by: NURSE PRACTITIONER

## 2024-10-10 RX ORDER — TRIAMCINOLONE ACETONIDE 0.25 MG/G
OINTMENT TOPICAL 2 TIMES DAILY
Qty: 15 G | Refills: 0 | Status: SHIPPED | OUTPATIENT
Start: 2024-10-10

## 2024-10-10 RX ORDER — PERMETHRIN 50 MG/G
CREAM TOPICAL
Qty: 60 G | Refills: 1 | Status: SHIPPED | OUTPATIENT
Start: 2024-10-10

## 2024-10-10 NOTE — PROGRESS NOTES
Assessment & Plan     Scabies  Rash is not entirely consistent with scabies-however since there is known exposure and ongoing itch =I do think it would be reasonable to treat with permethrin.    Will also send in triamcinolone cream to put on significantly itchy areas. Recommend using no more than 2 weeks in a row.    - triamcinolone (KENALOG) 0.025 % external ointment; Apply topically 2 times daily.  - permethrin (ELIMITE) 5 % external cream; Apply cream from head to toe (except the face); leave on for 8-14 hours then wash off with water; reapply in 1 week if live mites appear.                Subjective   Diana is a 34 year old, presenting for the following health issues:  Derm Problem (Itchiness started mid August ; expose to scabies in June ; itchy all over )    History of Present Illness       Reason for visit:  Widespread itch - exposure to scabies  Symptom onset:  More than a month  Symptoms include:  Itching all over body  Symptom intensity:  Moderate  Symptom progression:  Worsening  Had these symptoms before:  No   She is taking medications regularly.     Mild rash noted on arms and abdomen.                   Objective    /69 (BP Location: Left arm, Patient Position: Sitting, Cuff Size: Adult Regular)   Pulse 78   Temp 97.7  F (36.5  C) (Oral)   LMP 09/25/2024 (Exact Date)   SpO2 98%   There is no height or weight on file to calculate BMI.  Physical Exam  Constitutional:       Appearance: Normal appearance.   Skin:     Findings: Rash (minor papular rash on arms and abdomen. no obvious burrows) present.   Neurological:      General: No focal deficit present.      Mental Status: She is alert and oriented to person, place, and time.   Psychiatric:         Mood and Affect: Mood normal.         Behavior: Behavior normal.                    Signed Electronically by: YAW PHELAN CNP

## 2024-12-20 ENCOUNTER — ANCILLARY PROCEDURE (OUTPATIENT)
Dept: GENERAL RADIOLOGY | Facility: CLINIC | Age: 34
End: 2024-12-20
Attending: FAMILY MEDICINE
Payer: COMMERCIAL

## 2024-12-20 DIAGNOSIS — M25.561 ACUTE PAIN OF RIGHT KNEE: ICD-10-CM
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
## 2024-12-20 DIAGNOSIS — M79.671 RIGHT FOOT PAIN: ICD-10-CM

## 2024-12-20 PROCEDURE — 73630 X-RAY EXAM OF FOOT: CPT | Mod: TC | Performed by: RADIOLOGY

## 2024-12-20 PROCEDURE — 73562 X-RAY EXAM OF KNEE 3: CPT | Mod: TC | Performed by: RADIOLOGY

## 2024-12-23 ENCOUNTER — PATIENT OUTREACH (OUTPATIENT)
Dept: CARE COORDINATION | Facility: CLINIC | Age: 34
End: 2024-12-23
Payer: COMMERCIAL
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
## 2024-12-30 ENCOUNTER — OFFICE VISIT (OUTPATIENT)
Dept: ORTHOPEDICS | Facility: CLINIC | Age: 34
End: 2024-12-30
Payer: COMMERCIAL

## 2024-12-30 DIAGNOSIS — M25.561 ACUTE PAIN OF RIGHT KNEE: ICD-10-CM

## 2024-12-30 DIAGNOSIS — M79.671 RIGHT FOOT PAIN: Primary | ICD-10-CM
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
## 2024-12-30 PROCEDURE — 99204 OFFICE O/P NEW MOD 45 MIN: CPT | Performed by: STUDENT IN AN ORGANIZED HEALTH CARE EDUCATION/TRAINING PROGRAM

## 2024-12-30 RX ORDER — DICLOFENAC SODIUM 75 MG/1
75 TABLET, DELAYED RELEASE ORAL 2 TIMES DAILY
Qty: 28 TABLET | Refills: 0 | Status: SHIPPED | OUTPATIENT
Start: 2024-12-30

## 2024-12-30 RX ORDER — MELOXICAM 15 MG/1
15 TABLET ORAL DAILY
Qty: 14 TABLET | Refills: 0 | Status: CANCELLED | OUTPATIENT
Start: 2024-12-30
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
## 2025-01-14 ENCOUNTER — OFFICE VISIT (OUTPATIENT)
Dept: ORTHOPEDICS | Facility: CLINIC | Age: 35
End: 2025-01-14
Payer: COMMERCIAL

## 2025-01-14 ENCOUNTER — PRE VISIT (OUTPATIENT)
Dept: ORTHOPEDICS | Facility: CLINIC | Age: 35
End: 2025-01-14

## 2025-01-14 DIAGNOSIS — S90.31XD CONTUSION OF RIGHT FOOT, SUBSEQUENT ENCOUNTER: ICD-10-CM

## 2025-01-14 DIAGNOSIS — M20.21 HALLUX RIGIDUS, RIGHT FOOT: ICD-10-CM

## 2025-01-14 DIAGNOSIS — S80.01XD CONTUSION OF RIGHT KNEE, SUBSEQUENT ENCOUNTER: ICD-10-CM
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
## 2025-01-14 PROCEDURE — 99213 OFFICE O/P EST LOW 20 MIN: CPT | Performed by: FAMILY MEDICINE
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
## 2025-01-14 NOTE — PATIENT INSTRUCTIONS
Exercises for Foot, Arch and Medial Ankle  Tendonitis of medial ankle /foot tendons, flat feet.        Towel Drags  Dragging the towel sideways challenges the muscles along the inner and outer sides of the foot. Begin seated in a chair with a towel spread out on the floor in front of your feet. Keeping your heel on the floor, rotate your right forefoot to the right. Using the ball of the foot, drag the towel to the left, working the medial muscles of the foot. After completing a set of 10 repetitions, reverse the direction of the movement to work the lateral side of the foot. Rotate the forefoot to the left and use the ball of the foot to drag the towel repeatedly to the right. Switch to your left foot.    Towel bunching  Smooth the towel or band out and place one foot on it, flat. You are going to be moving the towel toward yourself so have extra fabric in front of your foot.  Sit with your heels under your knees.  Make sure your legs and feet are parallel to each other with the toes pointing forward.    Extend Your Foot  Keep your heel down, lift your toes, and flex your foot back.  Your heel stays in place on the towel as you reach out evenly along both sides and the middle of your foot to get your foot to land on the towel as far out as you can.    Curl Your Foot to Grasp and Pull the Towel  Leave your heel where it is and pull the towel toward your heel by scooping it in with your arch and toes.  Use both sides of your foot and try for a deep dome under the arch area.  You will only get a little bit of the towel to move each time you extend and pull back. You might have to take a break to smooth the towel and band after a few reps.  Repeat the move at least 5 times and then do the other side.    Towel Stretch for Calves  Stretching the muscles that assist dorsiflexion -- the gastrocnemius and soleus muscles of the calf -- improves foot and ankle mobility. To lengthen and loosen the gastrocnemius, which is the  "more prominent of the the two calf muscles, sit on the floor with your legs extended in front of you. Loop the middle of the towel around the sole of your right foot and fully extend the leg. When you draw back on the ends of the towel, you should feel mild to moderate tension along the back of the lower leg. Repeating the exercise with the working knee bent targets the deeper-lying soleus. Complete two to four reps of each stretch with both legs, holding every stretch for up to 30 seconds.    Toe Flexor Stretch  Use your towel to isolate and gently stretch the muscles responsible for toe flexion. While seated in a chair with your feet on the floor in front of you, loop the towel under the toes of your right foot. Keeping the ball of the foot firmly on the floor, pull up gently on the ends of the towel. Work only the toes, pulling them toward your shin.    Stretch  After performing strengthening exercises for the toe flexors, stretch them out. For a seated stretch, cross your right foot over your left thigh. Grasp the toes of your right foot and gently pull them backwards until you feel a stretch under your foot. For a standing stretch, stand facing a wall at a distance of 1 or 2 feet. Keeping the ball of your right foot slightly off the floor, slide your right toes up the wall. With your heel still in contact with the floor, lean forward and allow your metatarsals to drop towards the floor. You should feel a stretch on the bottom of your foot.    Tips and Considerations  Before working with a towel, remove your shoes and socks. Working barefoot allows you to articulate all parts of the feet, enabling you to move the foot and toes through their full range of motion. Warm up your feet by prancing lightly in place for several minutes, and then perform the alphabet exercise, \"drawing\" the letters of the alphabet in the air with your big toe. When you use a towel, concentrate on isolating your feet and toes and avoid " movement elsewhere in your body. If you've injured your foot, toe or ankle in the past, speak to your doctor, physical therapist or  about the advisability of particular exercises. Working out too soon after such an injury can result in re-injury and long-term damage.    Exercises for flexor hallucis longus tendonitis  The following exercises are commonly prescribed to patients with this flexor hallucis longus tendonitis. You should discuss the suitability of these exercises with your physiotherapist prior to beginning them. Generally, they should be performed 3 times daily and only provided they do not cause or increase symptoms.    Foot and Ankle Up and Down  Move your foot and ankle up and down as far as possible and comfortable without pain (figure 3). Repeat 10 - 20 times provided there is no increase in symptoms.    Exercises for Flexor Hallucis Longus Tendonitis - Foot & Ankle Up & Down    Foot and Ankle In and Out  Move your foot and ankle in and out as far as possible and comfortable without pain (figure 4). Repeat 10 -20 times provided there is no increase in symptoms.    Lunge Stretch  With your hands against the wall, place your leg to be stretched in front of you as demonstrated (figure 5). Keep your heel down. Gently move your knee forward over your toes as far as possible and comfortable without pain. Hold for 5 seconds and repeat 10 times at a mild to moderate stretch provided there is no increase in symptoms.    Toe stretches for flexor digitorum muscle and tendon      Calf Raise to Big Toe Press  Stand on the edge of a stair in your bare feet. Let your heels drop below the level of the stair. Then perform a traditional calf raise, but then proceed and press onto your big toe. This part is difficult for most. Feel free to hang on to something for balance. Perform 12-15 reps.

## 2025-02-07 PROBLEM — R87.610 ASCUS WITH POSITIVE HIGH RISK HPV CERVICAL: Status: ACTIVE | Noted: 2022-03-24

## 2025-02-07 PROBLEM — R87.810 ASCUS WITH POSITIVE HIGH RISK HPV CERVICAL: Status: ACTIVE | Noted: 2022-03-24

## 2025-02-07 RX ORDER — METHYLPREDNISOLONE 4 MG/1
4 TABLET ORAL
COMMUNITY
Start: 2025-01-25

## 2025-03-11 NOTE — PROGRESS NOTES
Rheumatology Clinic Visit  [facility]  Monserrat Chopra EDILBERTO     Diana Rothman MRN# [id number]   YOB: 1990 Age: 34 year old   Date of Visit: 3/17/2025  Primary care provider: Pamela Robb          Assessment and Plan:     1.  Positive CARLENE  2.  Dermatitis due to sun  3.  Polyarthralgia    Patient presents today for an initial evaluation.  She states that she was having some migrating joint pain and had mentioned it to her provider at her annual physical.  They checked some labs and she had a positive CARLENE but was overall doing well, therefore it was monitored.  She does a lot of traveling for work and was in a warmer climate in January and developed a rash that was very pruritic on her chest.  She did need steroids for it to resolve.  Physical examination today does not show any active synovitis or dactylitis.  She is full fist formation.  No skin rashes noted on exam today.  No mucositis.  Previous laboratory evaluation and imaging studies were reviewed, results below.    Reviewed the specialty of rheumatology today.  Discussed with the patient that a positive CARLENE is of unknown significance.  We discussed that the majority of positive ANAs are false positives.  10 to 30% of the healthy population can also have a positive CARLENE that is not associated with any disorder.  Thyroiditis and hepatitis have also been known to be associated with a positive CARLENE.  We also discussed that viral and bacterial infections could also have a positive CARLENE.  Discussed that 99% of people who have systemic lupus erythematous have a positive CARLENE but the majority of people that have a positive CARLENE do not have lupus.  An CARLENE can also be seen with other rheumatological autoimmune disorders such as scleroderma.  On recheck at an outside lab her CARLENE came back at 1: 320.  Therefore would recommend checking the CARLENE subsets.  I will contact patient with the results once they are complete.    Plan:     Schedule follow-up  with Monserrat Chopra PA-C depending on results of today's work up  Labs: dsDNA, complement levels, MAILE panel, Scl-70, Centromere antibody    Monserrat Chopra, PAC  Rheumatology         History of Present Illness:   Diana Rothman presents for evaluation of positive CARLENE, dermatitis due to sun    She had her annual physical. She has a history of wrist and ankle pain for 5-6 years, but her knees then started to hurt. She has not seen swelling, but her knees feel swollen. The pain seems to move around. She travels for work and she got a rash after she had been in the sun in January. It was very itchy. She had bleeding gums and bruising at that time. She has GI issues, will have stomach upset or nausea for a few days. She will get red dots around her eyes. She has silent reflux and she will clear her throat. Dry eyes recently. Some dry mouth, she finds that she is thirsty a lot. No mouth sores. She has arthritis in 2 of her toes. No hairs. No raynaud's. No history of blood clots or seizures. No recurrent miscarriages. No history of pericarditis or pleural effusion. No fevers.     Cousin with ankylosing spondylitis. No known family history of psoriasis, ulcerative colitis or crohn's.          Review of Systems:     Constitutional: negative  Skin: as above  Eyes: negative  Ears/Nose/Throat: negative  Respiratory: No shortness of breath, dyspnea on exertion, cough, or hemoptysis  Cardiovascular: negative  Gastrointestinal: negative  Genitourinary: negative  Musculoskeletal: as above  Neurologic: negative  Psychiatric: negative  Hematologic/Lymphatic/Immunologic: negative  Endocrine: negative         Active Problem List:     Patient Active Problem List    Diagnosis Date Noted    Papanicolaou smear of cervix with low grade squamous intraepithelial lesion (LGSIL) 05/02/2024     Priority: Medium     See positive cervical HR HPV problem      Cervical high risk HPV (human papillomavirus) test positive 05/02/2024     Priority:  Medium     03/16/22 ASCUS Pap +HR HPV (not 16 or 18)  04/22/22 [place] Bx and ECC No LAW   All above in care everywhere   04/24/24 LSIL Pap, +HR HPV (not 16 or 18) Plan cotest in 1 year due 04/24/25. Results and recommendations released to the pt through [facility].  Seen by patient Diana Rothman on 5/2/2024 11:31 PM             ASCUS with positive high risk HPV cervical 03/24/2022     Priority: Medium     Per visit note dated March 16, 2022,  Hx of abnormal paps: no   2022 ASCUS HPV  Positive for High Risk HPV types other than 16 or 18 32 y.o. - 1st Pap  PLAN, per ASCCP Guidelines: Colposcopy              Past Medical History:   No past medical history on file.  No past surgical history on file.         Social History:     Social History     Socioeconomic History    Marital status: Single     Spouse name: Not on file    Number of children: Not on file    Years of education: Not on file    Highest education level: Not on file   Occupational History    Not on file   Tobacco Use    Smoking status: Never    Smokeless tobacco: Never   Substance and Sexual Activity    Alcohol use: Not on file    Drug use: Not on file    Sexual activity: Not on file   Other Topics Concern    Not on file   Social History Narrative    Not on file     Social Drivers of Health     Financial Resource Strain: Low Risk  (4/23/2024)    Financial Resource Strain     Within the past 12 months, have you or your family members you live with been unable to get utilities (heat, electricity) when it was really needed?: No   Food Insecurity: Low Risk  (4/23/2024)    Food Insecurity     Within the past 12 months, did you worry that your food would run out before you got money to buy more?: No     Within the past 12 months, did the food you bought just not last and you didn t have money to get more?: No   Transportation Needs: Low Risk  (4/23/2024)    Transportation Needs     Within the past 12 months, has lack of transportation kept you from medical  appointments, getting your medicines, non-medical meetings or appointments, work, or from getting things that you need?: No   Physical Activity: Insufficiently Active (4/23/2024)    Exercise Vital Sign     Days of Exercise per Week: 3 days     Minutes of Exercise per Session: 40 min   Stress: Stress Concern Present (4/23/2024)    Citizen of [place] [employer] - Occupational Stress Questionnaire     Feeling of Stress : Rather much   Social Connections: Unknown (4/23/2024)    Social Connection and Isolation Panel [NHANES]     Frequency of Communication with Friends and Family: Not on file     Frequency of Social Gatherings with Friends and Family: Three times a week     Attends Rastafari Services: Not on file     Active Member of Clubs or Organizations: Not on file     Attends Club or Organization Meetings: Not on file     Marital Status: Not on file   Interpersonal Safety: Low Risk  (4/24/2024)    Interpersonal Safety     Do you feel physically and emotionally safe where you currently live?: Yes     Within the past 12 months, have you been hit, slapped, kicked or otherwise physically hurt by someone?: No     Within the past 12 months, have you been humiliated or emotionally abused in other ways by your partner or ex-partner?: No   Housing Stability: Low Risk  (4/23/2024)    Housing Stability     Do you have housing? : Yes     Are you worried about losing your housing?: No          Family History:     Family History   Problem Relation Age of Onset    Anxiety Disorder Mother     No Known Problems Father     No Known Problems Brother     Alzheimer Disease Maternal Grandmother     Cerebrovascular Disease Maternal Grandfather     Parkinsonism Maternal Grandfather     Prostate Cancer Paternal Grandfather 74    Breast Cancer Paternal Aunt 42    Genetic Disorder Paternal Aunt         check 2    Genetic Disorder Cousin         Check 2    Brain Cancer Cousin             Allergies:     Allergies   Allergen Reactions  "   Terbinafine Rash            Medications:     No current outpatient medications on file.            Physical Exam:   Blood pressure (!) 161/87, pulse 64, resp. rate 18, height 1.626 m (5' 4\"), weight 64.7 kg (142 lb 9.6 oz), last menstrual period 03/06/2025, SpO2 100%, not currently breastfeeding.  Wt Readings from Last 6 Encounters:   12/20/24 64.4 kg (142 lb)   04/24/24 61.7 kg (136 lb)   03/15/24 64.5 kg (142 lb 3.2 oz)   02/26/24 64.4 kg (142 lb)   07/03/23 65.3 kg (144 lb)     Constitutional: well-developed, appearing stated age; cooperative  Eyes: nl PERRLA, conjunctiva, sclera  ENT: nl external ears, nose, hearing, lips, teeth, gums, throat. No mucositis.   No mucous membrane lesions, normal saliva pool  Neck: no mass or thyroid enlargement  Resp: No shortness of breath with normal conversation  Lymph: no cervical, supraclavicular or epitrochlear nodes  MS: The TMJ, neck, shoulder, elbow, wrist, MCP/PIP/DIP, spine,  knee, ankle, and foot MTP/IP joints were examined and found normal. No active synovitis or altered joint anatomy. Full joint ROM. Normal  strength. No dactylitis,  tenosynovitis, enthesopathy.   Skin: no nail pitting, alopecia, rash, nodules or lesions.   Psych: nl judgement, orientation, memory, affect.           Data:   Imaging:  X-ray right knee 12/20/2024  Impression: Normal joint spaces and alignment.  No fracture.  Minimal effusion.    X-ray right foot 12/20/2024  Impression: Normal alignment.  No acute fracture.  Mild degenerative arthritis of the first MTP joint    Laboratory:  5/21/2024  Creatinine 0.91, GFR 84  Albumin 4.3  ALT 20, AST 29  CRP less than 3.0  CCP antibody negative  Rheumatoid factor negative  TSH 1.18  White blood cell count 4.2, hemoglobin 13.3, platelet count 277  Sed rate 4  Treponema antibody nonreactive  Hepatitis C nonreactive  HIV nonreactive  CARLENE positive with a dense fine speckled pattern and a titer of 1:160    "

## 2025-03-13 SDOH — HEALTH STABILITY: PHYSICAL HEALTH: ON AVERAGE, HOW MANY MINUTES DO YOU ENGAGE IN EXERCISE AT THIS LEVEL?: 20 MIN

## 2025-03-13 SDOH — HEALTH STABILITY: PHYSICAL HEALTH: ON AVERAGE, HOW MANY DAYS PER WEEK DO YOU ENGAGE IN MODERATE TO STRENUOUS EXERCISE (LIKE A BRISK WALK)?: 4 DAYS

## 2025-03-13 ASSESSMENT — SOCIAL DETERMINANTS OF HEALTH (SDOH): HOW OFTEN DO YOU GET TOGETHER WITH FRIENDS OR RELATIVES?: TWICE A WEEK

## 2025-03-17 ENCOUNTER — OFFICE VISIT (OUTPATIENT)
Dept: FAMILY MEDICINE | Facility: CLINIC | Age: 35
End: 2025-03-17
Payer: COMMERCIAL

## 2025-03-17 ENCOUNTER — OFFICE VISIT (OUTPATIENT)
Dept: RHEUMATOLOGY | Facility: CLINIC | Age: 35
End: 2025-03-17
Attending: FAMILY MEDICINE
Payer: COMMERCIAL

## 2025-03-17 ENCOUNTER — OFFICE VISIT (OUTPATIENT)
Dept: ORTHOPEDICS | Facility: CLINIC | Age: 35
End: 2025-03-17
Payer: COMMERCIAL

## 2025-03-17 ENCOUNTER — TRANSFERRED RECORDS (OUTPATIENT)
Dept: HEALTH INFORMATION MANAGEMENT | Facility: CLINIC | Age: 35
End: 2025-03-17

## 2025-03-17 VITALS
BODY MASS INDEX: 24.24 KG/M2 | OXYGEN SATURATION: 100 % | HEART RATE: 67 BPM | SYSTOLIC BLOOD PRESSURE: 128 MMHG | TEMPERATURE: 97.9 F | HEIGHT: 64 IN | DIASTOLIC BLOOD PRESSURE: 85 MMHG | RESPIRATION RATE: 18 BRPM | WEIGHT: 142 LBS

## 2025-03-17 VITALS
WEIGHT: 142.6 LBS | HEIGHT: 64 IN | SYSTOLIC BLOOD PRESSURE: 143 MMHG | BODY MASS INDEX: 24.34 KG/M2 | DIASTOLIC BLOOD PRESSURE: 89 MMHG | OXYGEN SATURATION: 100 % | RESPIRATION RATE: 18 BRPM | HEART RATE: 64 BPM
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
## 2025-03-17 DIAGNOSIS — R11.0 CHRONIC NAUSEA: ICD-10-CM

## 2025-03-17 DIAGNOSIS — Z11.3 SCREENING EXAMINATION FOR STI: ICD-10-CM

## 2025-03-17 DIAGNOSIS — M25.50 POLYARTHRALGIA: ICD-10-CM

## 2025-03-17 DIAGNOSIS — R76.8 POSITIVE ANA (ANTINUCLEAR ANTIBODY): Primary | ICD-10-CM

## 2025-03-17 DIAGNOSIS — F90.0 ATTENTION DEFICIT HYPERACTIVITY DISORDER (ADHD), PREDOMINANTLY INATTENTIVE TYPE: Primary | ICD-10-CM

## 2025-03-17 DIAGNOSIS — S90.31XD CONTUSION OF RIGHT FOOT, SUBSEQUENT ENCOUNTER: Primary | ICD-10-CM

## 2025-03-17 DIAGNOSIS — L57.8 DERMATITIS DUE TO SUN: ICD-10-CM

## 2025-03-17 LAB
ATRIAL RATE - MUSE: 59 BPM
DIASTOLIC BLOOD PRESSURE - MUSE: NORMAL MMHG
INTERPRETATION ECG - MUSE: NORMAL
P AXIS - MUSE: 22 DEGREES
PR INTERVAL - MUSE: 136 MS
QRS DURATION - MUSE: 84 MS
QT - MUSE: 432 MS
QTC - MUSE: 427 MS
R AXIS - MUSE: 42 DEGREES
SYSTOLIC BLOOD PRESSURE - MUSE: NORMAL MMHG
T AXIS - MUSE: 21 DEGREES
T PALLIDUM AB SER QL: NONREACTIVE
VENTRICULAR RATE- MUSE: 59 BPM

## 2025-03-17 PROCEDURE — 3077F SYST BP >= 140 MM HG: CPT | Performed by: PHYSICIAN ASSISTANT

## 2025-03-17 PROCEDURE — 99000 SPECIMEN HANDLING OFFICE-LAB: CPT | Performed by: FAMILY MEDICINE

## 2025-03-17 PROCEDURE — 3079F DIAST BP 80-89 MM HG: CPT | Performed by: PHYSICIAN ASSISTANT

## 2025-03-17 PROCEDURE — 86160 COMPLEMENT ANTIGEN: CPT | Performed by: PHYSICIAN ASSISTANT

## 2025-03-17 PROCEDURE — 1126F AMNT PAIN NOTED NONE PRSNT: CPT | Performed by: PHYSICIAN ASSISTANT

## 2025-03-17 PROCEDURE — 83013 H PYLORI (C-13) BREATH: CPT | Mod: 90 | Performed by: FAMILY MEDICINE

## 2025-03-17 PROCEDURE — 3074F SYST BP LT 130 MM HG: CPT | Performed by: FAMILY MEDICINE

## 2025-03-17 PROCEDURE — 87591 N.GONORRHOEAE DNA AMP PROB: CPT | Mod: 59 | Performed by: FAMILY MEDICINE

## 2025-03-17 PROCEDURE — 93010 ELECTROCARDIOGRAM REPORT: CPT | Performed by: INTERNAL MEDICINE

## 2025-03-17 PROCEDURE — 87491 CHLMYD TRACH DNA AMP PROBE: CPT | Performed by: FAMILY MEDICINE

## 2025-03-17 PROCEDURE — 93005 ELECTROCARDIOGRAM TRACING: CPT | Performed by: FAMILY MEDICINE

## 2025-03-17 PROCEDURE — 99214 OFFICE O/P EST MOD 30 MIN: CPT | Performed by: STUDENT IN AN ORGANIZED HEALTH CARE EDUCATION/TRAINING PROGRAM

## 2025-03-17 PROCEDURE — 86235 NUCLEAR ANTIGEN ANTIBODY: CPT | Performed by: PHYSICIAN ASSISTANT

## 2025-03-17 PROCEDURE — 87389 HIV-1 AG W/HIV-1&-2 AB AG IA: CPT | Performed by: FAMILY MEDICINE

## 2025-03-17 PROCEDURE — 3079F DIAST BP 80-89 MM HG: CPT | Performed by: FAMILY MEDICINE

## 2025-03-17 PROCEDURE — 99204 OFFICE O/P NEW MOD 45 MIN: CPT | Performed by: PHYSICIAN ASSISTANT

## 2025-03-17 PROCEDURE — 36415 COLL VENOUS BLD VENIPUNCTURE: CPT | Performed by: PHYSICIAN ASSISTANT

## 2025-03-17 PROCEDURE — 87591 N.GONORRHOEAE DNA AMP PROB: CPT | Performed by: FAMILY MEDICINE

## 2025-03-17 PROCEDURE — 87491 CHLMYD TRACH DNA AMP PROBE: CPT | Mod: 59 | Performed by: FAMILY MEDICINE

## 2025-03-17 PROCEDURE — 86780 TREPONEMA PALLIDUM: CPT | Performed by: FAMILY MEDICINE

## 2025-03-17 PROCEDURE — 86225 DNA ANTIBODY NATIVE: CPT | Performed by: PHYSICIAN ASSISTANT

## 2025-03-17 RX ORDER — ONDANSETRON 4 MG/1
4 TABLET, ORALLY DISINTEGRATING ORAL EVERY 8 HOURS PRN
Qty: 20 TABLET | Refills: 3 | Status: SHIPPED | OUTPATIENT
Start: 2025-03-17

## 2025-03-17 RX ORDER — LISDEXAMFETAMINE DIMESYLATE 10 MG/1
10 CAPSULE ORAL EVERY MORNING
Qty: 30 CAPSULE | Refills: 0 | Status: SHIPPED | OUTPATIENT
Start: 2025-03-17

## 2025-03-17 ASSESSMENT — PAIN SCALES - GENERAL: PAINLEVEL_OUTOF10: NO PAIN (0)

## 2025-03-17 ASSESSMENT — ENCOUNTER SYMPTOMS: NAUSEA: 1
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
## 2025-03-17 NOTE — PATIENT INSTRUCTIONS
After Visit Instructions:     Thank you for coming to [facility] for your care. It is my goal to partner with you to help you reach your optimal state of health.       Plan:     Schedule follow-up with Monserrat Chopra PA-C depending on results of today's work up  Labs: dsDNA, complement levels, MAILE panel, Scl-70, Centromere antibody    Monserrat Chopra PA-C  [facility]  [facility]    Contact information: [facility]  Clinic Number:  [phone]  Please call or send a [employer] message with any questions about your care

## 2025-03-18 LAB
C TRACH DNA SPEC QL NAA+PROBE: NEGATIVE
C TRACH DNA SPEC QL PROBE+SIG AMP: NEGATIVE
C3 SERPL-MCNC: 115 MG/DL (ref 81–157)
C4 SERPL-MCNC: 19 MG/DL (ref 13–39)
CENTROMERE B AB SER-ACNC: <0.7 U/ML
CENTROMERE B AB SER-ACNC: NEGATIVE
DSDNA AB SER-ACNC: 1.9 IU/ML
ENA SCL70 IGG SER IA-ACNC: <0.6 U/ML
ENA SCL70 IGG SER IA-ACNC: NEGATIVE
HIV 1+2 AB+HIV1 P24 AG SERPL QL IA: NONREACTIVE
N GONORRHOEA DNA SPEC QL NAA+PROBE: NEGATIVE
N GONORRHOEA DNA SPEC QL NAA+PROBE: NEGATIVE
SPECIMEN TYPE: NORMAL
UREA BREATH TEST QL: NEGATIVE

## 2025-03-19 ENCOUNTER — THERAPY VISIT (OUTPATIENT)
Dept: PHYSICAL THERAPY | Facility: CLINIC | Age: 35
End: 2025-03-19
Attending: STUDENT IN AN ORGANIZED HEALTH CARE EDUCATION/TRAINING PROGRAM
Payer: COMMERCIAL
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
## 2025-03-19 DIAGNOSIS — S90.31XD CONTUSION OF RIGHT FOOT, SUBSEQUENT ENCOUNTER: ICD-10-CM

## 2025-03-19 LAB
ENA SM IGG SER IA-ACNC: <0.7 U/ML
ENA SM IGG SER IA-ACNC: NEGATIVE
ENA SS-A AB SER IA-ACNC: <0.5 U/ML
ENA SS-A AB SER IA-ACNC: NEGATIVE
ENA SS-B IGG SER IA-ACNC: <0.6 U/ML
ENA SS-B IGG SER IA-ACNC: NEGATIVE
U1 SNRNP IGG SER IA-ACNC: 1.7 U/ML
U1 SNRNP IGG SER IA-ACNC: NEGATIVE

## 2025-03-19 PROCEDURE — 97110 THERAPEUTIC EXERCISES: CPT | Mod: GP

## 2025-03-19 PROCEDURE — 97161 PT EVAL LOW COMPLEX 20 MIN: CPT | Mod: GP

## 2025-03-19 ASSESSMENT — ACTIVITIES OF DAILY LIVING (ADL)
SQUATTING: A LITTLE BIT OF DIFFICULTY
SHOPPING: QUITE A BIT OF DIFFICULTY
WALKING_A_MILE: A LITTLE BIT OF DIFFICULTY
YOUR_USUAL_HOBBIES,_RECREATIONAL_OR_SPORTING_ACTIVITIES: MODERATE DIFFICULTY
PUTTING_ON_YOUR_SHOES_OR_SOCKS: NO DIFFICULTY
RUNNING_ON_UNEVEN_GROUND: QUITE A BIT OF DIFFICULTY
MAKING_SHARP_TURNS_WHILE_RUNNING_FAST: QUITE A BIT OF DIFFICULTY
PLEASE_INDICATE_YOR_PRIMARY_REASON_FOR_REFERRAL_TO_THERAPY:: FOOT AND/OR ANKLE
ROLLING_OVER_IN_BED: NO DIFFICULTY
WALKING_BETWEEN_ROOMS: NO DIFFICULTY
SITTING_FOR_1_HOUR: NO DIFFICULTY
RUNNING_ON_EVEN_GROUND: MODERATE DIFFICULTY
GETTING_INTO_AND_OUT_OF_A_BATH: NO DIFFICULTY
ANY_OF_YOUR_USUAL_WORK,_HOUSEWORK_OR_SCHOOL_ACTIVITIES: A LITTLE BIT OF DIFFICULTY
STANDING_FOR_1_HOUR: NO DIFFICULTY
GOING_UP_OR_DOWN_10_STAIRS: A LITTLE BIT OF DIFFICULTY
GETTING_INTO_OR_OUT_OF_A_CAR: NO DIFFICULTY
PERFORMING_LIGHT_ACTIVITIES_AROUND_YOUR_HOME: NO DIFFICULTY
LEFS_RAW_SCORE: 0
LIFTING_AN_OBJECT,_LIKE_A_BAG_OF_GROCERIES_FROM_THE_FLOOR: NO DIFFICULTY
LEFS_SCORE(%): 0
WALKING_2_BLOCKS: NO DIFFICULTY
PERFORMING_HEAVY_ACTIVITIES_AROUND_YOUR_HOME: A LITTLE BIT OF DIFFICULTY
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
## 2025-03-21 ENCOUNTER — TELEPHONE (OUTPATIENT)
Dept: GASTROENTEROLOGY | Facility: CLINIC | Age: 35
End: 2025-03-21
Payer: COMMERCIAL

## 2025-03-21 NOTE — TELEPHONE ENCOUNTER
[facility] Call Center    Phone Message    May a detailed message be left on voicemail: Yes    Reason for Call: Other: Patient is currently scheduled on 4/22, as visit type New GI Urgent. This is outside the expected timeline for this referral. Patient has been added to the waitlist.      [place] town [place]10[place] [facility] [place]     Action Taken: Message routed to:  Other: GI REFERRAL TRIAGE POOL     Travel Screening: Not Applicable

## 2025-04-09 PROBLEM — R87.810 CERVICAL HIGH RISK HPV (HUMAN PAPILLOMAVIRUS) TEST POSITIVE: Status: ACTIVE | Noted: 2024-05-02

## 2025-04-13 SDOH — HEALTH STABILITY: PHYSICAL HEALTH: ON AVERAGE, HOW MANY MINUTES DO YOU ENGAGE IN EXERCISE AT THIS LEVEL?: 30 MIN

## 2025-04-13 SDOH — HEALTH STABILITY: PHYSICAL HEALTH: ON AVERAGE, HOW MANY DAYS PER WEEK DO YOU ENGAGE IN MODERATE TO STRENUOUS EXERCISE (LIKE A BRISK WALK)?: 3 DAYS

## 2025-04-13 ASSESSMENT — SOCIAL DETERMINANTS OF HEALTH (SDOH): HOW OFTEN DO YOU GET TOGETHER WITH FRIENDS OR RELATIVES?: THREE TIMES A WEEK

## 2025-04-18 ENCOUNTER — OFFICE VISIT (OUTPATIENT)
Dept: FAMILY MEDICINE | Facility: CLINIC | Age: 35
End: 2025-04-18
Payer: COMMERCIAL

## 2025-04-18 ENCOUNTER — HOSPITAL ENCOUNTER (OUTPATIENT)
Dept: MRI IMAGING | Facility: HOSPITAL | Age: 35
Discharge: HOME | End: 2025-04-18
Attending: STUDENT IN AN ORGANIZED HEALTH CARE EDUCATION/TRAINING PROGRAM | Admitting: STUDENT IN AN ORGANIZED HEALTH CARE EDUCATION/TRAINING PROGRAM
Payer: COMMERCIAL

## 2025-04-18 VITALS
HEART RATE: 79 BPM | OXYGEN SATURATION: 97 % | BODY MASS INDEX: 24.07 KG/M2 | WEIGHT: 141 LBS | TEMPERATURE: 98.2 F | RESPIRATION RATE: 17 BRPM | SYSTOLIC BLOOD PRESSURE: 114 MMHG | HEIGHT: 64 IN | DIASTOLIC BLOOD PRESSURE: 68 MMHG

## 2025-04-18 DIAGNOSIS — Z11.3 SCREENING EXAMINATION FOR STI: ICD-10-CM

## 2025-04-18 DIAGNOSIS — Z00.00 ROUTINE GENERAL MEDICAL EXAMINATION AT A HEALTH CARE FACILITY: Primary | ICD-10-CM

## 2025-04-18 DIAGNOSIS — Z12.4 CERVICAL CANCER SCREENING: ICD-10-CM

## 2025-04-18 DIAGNOSIS — S90.31XD CONTUSION OF RIGHT FOOT, SUBSEQUENT ENCOUNTER: ICD-10-CM
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
## 2025-04-18 DIAGNOSIS — F90.0 ATTENTION DEFICIT HYPERACTIVITY DISORDER (ADHD), PREDOMINANTLY INATTENTIVE TYPE: ICD-10-CM

## 2025-04-18 PROBLEM — M20.5X9 HALLUX LIMITUS: Status: ACTIVE | Noted: 2023-12-21

## 2025-04-18 PROCEDURE — G0124 SCREEN C/V THIN LAYER BY MD: HCPCS | Performed by: PATHOLOGY

## 2025-04-18 PROCEDURE — 3078F DIAST BP <80 MM HG: CPT | Performed by: FAMILY MEDICINE

## 2025-04-18 PROCEDURE — 99395 PREV VISIT EST AGE 18-39: CPT | Performed by: FAMILY MEDICINE

## 2025-04-18 PROCEDURE — G2211 COMPLEX E/M VISIT ADD ON: HCPCS | Performed by: FAMILY MEDICINE

## 2025-04-18 PROCEDURE — 3074F SYST BP LT 130 MM HG: CPT | Performed by: FAMILY MEDICINE

## 2025-04-18 PROCEDURE — 99213 OFFICE O/P EST LOW 20 MIN: CPT | Mod: 25 | Performed by: FAMILY MEDICINE

## 2025-04-18 PROCEDURE — G0145 SCR C/V CYTO,THINLAYER,RESCR: HCPCS | Performed by: FAMILY MEDICINE

## 2025-04-18 PROCEDURE — 87624 HPV HI-RISK TYP POOLED RSLT: CPT | Performed by: FAMILY MEDICINE

## 2025-04-18 PROCEDURE — 1126F AMNT PAIN NOTED NONE PRSNT: CPT | Performed by: FAMILY MEDICINE

## 2025-04-18 PROCEDURE — 73718 MRI LOWER EXTREMITY W/O DYE: CPT | Mod: RT

## 2025-04-18 RX ORDER — LISDEXAMFETAMINE DIMESYLATE 30 MG/1
30 CAPSULE ORAL EVERY MORNING
Qty: 30 CAPSULE | Refills: 0 | Status: SHIPPED | OUTPATIENT
Start: 2025-04-18

## 2025-04-18 ASSESSMENT — PAIN SCALES - GENERAL: PAINLEVEL_OUTOF10: NO PAIN (0)

## 2025-04-18 NOTE — PROGRESS NOTES
Preventive Care Visit  [facility]  STORM FLORES MD, Family Medicine  Apr 18, 2025      Assessment & Plan     Routine general medical examination at a health care facility    ADHD inattentive  Increase Vyvanse to 30 mg by her choice, rather than 20 mg.  Will message about how she is doing before additional refills.  - lisdexamfetamine (VYVANSE) 30 MG capsule; Take 1 capsule (30 mg) by mouth every morning.    Cervical cancer screening  - HPV and Gynecologic Cytology Panel - Recommended Age 30 - 65 Years  - Gynecologic Cytology (PAP)    Screening examination for STI  - Chlamydia trachomatis/Neisseria gonorrhoeae by PCR; Standing  - Chlamydia trachomatis/Neisseria gonorrhoeae by PCR; Standing  - Treponema Abs w Reflex to RPR and Titer; Standing  - HIV Antigen Antibody Combo; Standing    Message in a month. Recheck in 3-4 months.    Counseling  Appropriate preventive services were addressed with this patient via screening, questionnaire, or discussion as appropriate for fall prevention, nutrition, physical activity, Tobacco-use cessation, social engagement, weight loss and cognition.  Checklist reviewing preventive services available has been given to the patient.    Humera Ortiz is a 35 year old, presenting for the following:  Annual Visit (Annual preventative adult check up.   Follow up on ADHD medication. )        4/18/2025     2:19 PM   Additional Questions   Roomed by teresa RN   Accompanied by alone          Has not had rash again. The labs were good.  The nausea improved. The ondansetron helped but only needed it once.  Vyvanse did not change anything. In counseling.  Advance Care Planning    Discussed advance care planning with patient; informed AVS has link to [employer].        4/13/2025   General Health   How would you rate your overall physical health? (!) FAIR   Feel stress (tense, anxious, or unable to sleep) Only a little   (!) STRESS CONCERN      4/13/2025   Nutrition    Three or more servings of calcium each day? (!) NO   Diet: Regular (no restrictions)   How many servings of fruit and vegetables per day? (!) 2-3   How many sweetened beverages each day? 0-1         4/13/2025   Exercise   Days per week of moderate/strenous exercise 3 days   Average minutes spent exercising at this level 30 min         4/13/2025   Social Factors   Frequency of gathering with friends or relatives Three times a week   Worry food won't last until get money to buy more No   Food not last or not have enough money for food? No   Do you have housing? (Housing is defined as stable permanent housing and does not include staying ouside in a car, in a tent, in an abandoned building, in an overnight shelter, or couch-surfing.) Yes   Are you worried about losing your housing? No   Lack of transportation? No   Unable to get utilities (heat,electricity)? No         4/13/2025   Dental   Dentist two times every year? (!) NO       Today's PHQ-2 Score:       1/14/2025     3:03 PM   PHQ-2 ( 1999 Pfizer)   Q1: Little interest or pleasure in doing things 1   Q2: Feeling down, depressed or hopeless 0   PHQ-2 Score 1         4/13/2025   Substance Use   Alcohol more than 3/day or more than 7/wk No   Do you use any other substances recreationally? No     Social History     Tobacco Use    Smoking status: Never     Passive exposure: Never    Smokeless tobacco: Never   Vaping Use    Vaping status: Never Used   Substance Use Topics    Alcohol use: Yes     Alcohol/week: 7.0 standard drinks of alcohol     Types: 7 Shots of liquor per week    Drug use: Not Currently           3/10/2024   LAST FHS-7 RESULTS   1st degree relative breast or ovarian cancer No   Any relative bilateral breast cancer Unknown   Any male have breast cancer No   Any ONE woman have BOTH breast AND ovarian cancer No   Any woman with breast cancer before 50yrs Yes   2 or more relatives with breast AND/OR ovarian cancer Yes   2 or more relatives with breast  "AND/OR bowel cancer No        Mammogram Screening - Patient under 40 years of age: Routine Mammogram Screening not recommended.         4/13/2025   STI Screening   New sexual partner(s) since last STI/HIV test? No     History of abnormal Pap smear: No - age 21 - 65 - Patient with other risk factor requiring more frequent screening - see link Cervical Cytology Screening Guidelines        Latest Ref Rng & Units 4/24/2024    10:09 AM   PAP / HPV   PAP  Low-grade squamous intraepithelial lesion (LSIL) encompassing HPV/mild dysplasia/CIN1    HPV 16 DNA Negative Negative    HPV 18 DNA Negative Negative    Other HR HPV Negative Positive            4/13/2025   Contraception/Family Planning   Questions about contraception or family planning No        Reviewed and updated as needed this visit by Provider                     Objective    Exam  /68 (BP Location: Left arm, Patient Position: Sitting, Cuff Size: Adult Regular)   Pulse 79   Temp 98.2  F (36.8  C) (Temporal)   Resp 17   Ht 1.626 m (5' 4\")   Wt 64 kg (141 lb)   LMP 03/06/2025 (Exact Date)   SpO2 97%   BMI 24.20 kg/m     Estimated body mass index is 24.2 kg/m  as calculated from the following:    Height as of this encounter: 1.626 m (5' 4\").    Weight as of this encounter: 64 kg (141 lb).    Physical Exam  GENERAL: alert and no distress  EYES: Eyes grossly normal to inspection, PERRL and conjunctivae and sclerae normal  HENT: ear canals and TM's normal, nose and mouth without ulcers or lesions  NECK: no adenopathy, no asymmetry, masses, or scars  RESP: lungs clear to auscultation - no rales, rhonchi or wheezes  CV: regular rate and rhythm, normal S1 S2, no S3 or S4, no murmur, click or rub, no peripheral edema  ABDOMEN: soft, nontender, no hepatosplenomegaly, no masses and bowel sounds normal  : normal female external genitalia, normal urethral meatus, normal vaginal mucosa, and normal cervix masses or discharge  MS: no gross musculoskeletal defects " noted, no edema  SKIN: no suspicious lesions or rashes  NEURO: Normal strength and tone, mentation intact and speech normal  PSYCH: mentation appears normal, affect normal/bright  Signed Electronically by: STORM FLORES MD

## 2025-04-18 NOTE — PATIENT INSTRUCTIONS
[email or web address]  Please send me the results, thanks.    Patient Education   Preventive Care Advice   This is general advice given by our system to help you stay healthy. However, your care team may have specific advice just for you. Please talk to your care team about your preventive care needs.  Nutrition  Eat 5 or more servings of fruits and vegetables each day.  Try wheat bread, brown rice and whole grain pasta (instead of white bread, rice, and pasta).  Get enough calcium and vitamin D. Check the label on foods and aim for 100% of the RDA (recommended daily allowance).  Lifestyle  Exercise at least 150 minutes each week  (30 minutes a day, 5 days a week).  Do muscle strengthening activities 2 days a week. These help control your weight and prevent disease.  No smoking.  Wear sunscreen to prevent skin cancer.  Have a dental exam and cleaning every 6 months.  Yearly exams  See your health care team every year to talk about:  Any changes in your health.  Any medicines your care team has prescribed.  Preventive care, family planning, and ways to prevent chronic diseases.  Shots (vaccines)   HPV shots (up to age 26), if you've never had them before.  Hepatitis B shots (up to age 59), if you've never had them before.  COVID-19 shot: Get this shot when it's due.  Flu shot: Get a flu shot every year.  Tetanus shot: Get a tetanus shot every 10 years.  Pneumococcal, hepatitis A, and RSV shots: Ask your care team if you need these based on your risk.  Shingles shot (for age 50 and up)  General health tests  Diabetes screening:  Starting at age 35, Get screened for diabetes at least every 3 years.  If you are younger than age 35, ask your care team if you should be screened for diabetes.  Cholesterol test: At age 39, start having a cholesterol test every 5 years, or more often if advised.  Bone density scan (DEXA): At age 50, ask your care team if you should have this scan for osteoporosis  (brittle bones).  Hepatitis C: Get tested at least once in your life.  STIs (sexually transmitted infections)  Before age 24: Ask your care team if you should be screened for STIs.  After age 24: Get screened for STIs if you're at risk. You are at risk for STIs (including HIV) if:  You are sexually active with more than one person.  You don't use condoms every time.  You or a partner was diagnosed with a sexually transmitted infection.  If you are at risk for HIV, ask about PrEP medicine to prevent HIV.  Get tested for HIV at least once in your life, whether you are at risk for HIV or not.  Cancer screening tests  Cervical cancer screening: If you have a cervix, begin getting regular cervical cancer screening tests starting at age 21.  Breast cancer scan (mammogram): If you've ever had breasts, begin having regular mammograms starting at age 40. This is a scan to check for breast cancer.  Colon cancer screening: It is important to start screening for colon cancer at age 45.  Have a colonoscopy test every 10 years (or more often if you're at risk) Or, ask your provider about stool tests like a FIT test every year or Cologuard test every 3 years.  To learn more about your testing options, visit:   .  For help making a decision, visit:   https://bit.ly/go67726.  Prostate cancer screening test: If you have a prostate, ask your care team if a prostate cancer screening test (PSA) at age 55 is right for you.  Lung cancer screening: If you are a current or former smoker ages 50 to 80, ask your care team if ongoing lung cancer screenings are right for you.  For informational purposes only. Not to replace the advice of your health care provider. Copyright   2023 [employer]. All rights reserved. Clinically reviewed by the [facility] Transitions Program. [employer] [id number] - REV 01/24.

## 2025-04-22 ENCOUNTER — VIRTUAL VISIT (OUTPATIENT)
Dept: GASTROENTEROLOGY | Facility: CLINIC | Age: 35
End: 2025-04-22
Attending: FAMILY MEDICINE
Payer: COMMERCIAL

## 2025-04-22 VITALS — HEIGHT: 64 IN | WEIGHT: 141 LBS | BODY MASS INDEX: 24.07 KG/M2

## 2025-04-22 DIAGNOSIS — R19.5 LOOSE STOOLS: Primary | ICD-10-CM

## 2025-04-22 DIAGNOSIS — R11.0 CHRONIC NAUSEA: ICD-10-CM

## 2025-04-22 LAB
HPV HR 12 DNA CVX QL NAA+PROBE: POSITIVE
HPV16 DNA CVX QL NAA+PROBE: NEGATIVE
HPV18 DNA CVX QL NAA+PROBE: NEGATIVE
HUMAN PAPILLOMA VIRUS FINAL DIAGNOSIS: ABNORMAL

## 2025-04-22 PROCEDURE — 98001 SYNCH AUDIO-VIDEO NEW LOW 30: CPT | Performed by: PHYSICIAN ASSISTANT

## 2025-04-22 PROCEDURE — 1126F AMNT PAIN NOTED NONE PRSNT: CPT | Mod: 95 | Performed by: PHYSICIAN ASSISTANT

## 2025-04-22 ASSESSMENT — PAIN SCALES - GENERAL: PAINLEVEL_OUTOF10: NO PAIN (0)

## 2025-04-22 NOTE — NURSING NOTE
Current patient location: [address] [place] [address]    Is the patient currently in the state of [place]? YES    Visit mode: VIDEO    If the visit is dropped, the patient can be reconnected by:VIDEO VISIT: Send to e-mail at: [email or web address]    Will anyone else be joining the visit? NO  (If patient encounters technical issues they should call [phone]    Are changes needed to the allergy or medication list? No    Are refills needed on medications prescribed by this physician? NO    Rooming Documentation:  Questionnaire(s) completed    Reason for visit: Consult    Yenifer RIDLEY

## 2025-04-22 NOTE — LETTER
4/22/2025      Diana Rothman  [address]  [place] [address]      Dear Colleague,    Thank you for referring your patient, Diana Rothman, to the [facility] [place]. Please see a copy of my visit note below.    Virtual Visit Details    Type of service:  Video Visit     Originating Location (pt. Location): Home    Distant Location (provider location):  Off-site  Platform used for Video Visit: Prasanth    Joined the call at 4/22/2025, 1:08:22 pm.  Left the call at 4/22/2025, 1:44:01 pm.  You were on the call for 35 minutes 38 seconds.    GI CLINIC VISIT    CC/REFERRING MD:  Pamela Robb  REASON FOR CONSULTATION: nausea    ASSESSMENT/PLAN:  Diana Rothman is a 35 year old year old female with PMHx of ADHD (on Vyvanse - no correlation in GI sx) following with the [facility] GI group for GI distress characterized by days of abd upset, nausea, poor appetite that seems to worsen with certain foods (spicy foods) and improves with fasting. These episodes started in her 20s. She also notices these sx a week prior to her menstrual cycle starting. She is overall feeling well at baseline health. No alarm sx.     #GI distress - abd upset, nausea, poor appetite   Given chronicity (onset in her 20s), episodes are thought to be functional in nature. There could be a component of reflux as well. H.pylori testing was NEG. She is currently asymptomatic and at her baseline health. She does have baseline loose stools (soft serve, oatmeal) but no bloody stools or unintentional weight loss.  CBC, CMP, TSH were WNL     -fecal calpro and enteric panel ordered, though pattern, chronicity and stability makes it less likely to be either   -start of citrucel   -can trial IBGard or FDGard     Future consideration  1.GI RD, if agreeable   2. PPI trial during active episode  3. Bile acid sequestrant     Orders Placed This Encounter   Procedures     Calprotectin Feces     Enteric Bacteria and Virus  Panel by TRINY Stool     Colorectal cancer screenin    RTC 4 mo or sooner PRN     Thank you for this consultation.  It was a pleasure to participate in the care of this patient; please contact me with any further questions.     Krystal Seay PA-C  Per complexity     HPI  Diana Rothman is a 35 year old year old female with PMHx of ADHD (on Vyvanse - no correlation in GI sx) following with the [facility] GI group for GI distress.     1. Chronic intermittent nausea along w/ GI sx - ongoing since mid 20s   -she actually felt OK and almost wanted to cancel the appt but decided to keep it as her sx do flare up intermittently   -she typically has AM nausea that is overall managed if she doesn't eat or avoids breakfast  -she has had to cancel her flight due to nausea and vomiting x 1 in last year, but vomiting in general is not common for her. This occurred about a few weeks prior to bad acid reflux episode - given PPI that resolved the sx in a few days then stopped it and has not had recurrence of sx. She has made some lifestyle changes  -in 3/17/25 she had a NEG h.pylori breath test, not on PPI     --had seen ENT years ago, also seen with SLP and reportedly told she may have silent reflux or LPRD - sx of increased throat clearing. Some changes have helped this but will have some bouts of it intermittently   -she travels a lot of work     In [place] 2024, had GI upset. Abd pain, nausea, poor appetite .     Usually the week prior to her menstrual cycle she does have abd discomfort too - not usually a consistent thing     Before her period, she can have poor appetite and if she eats, she gets abd pain/discomfort (usually lower abdomen and feels like her food is not digesting, fullness and bloating and she had not been eating.) Pain started in past 3-4 years. Usually within a few hours of eating she will notice the abd pain and then when she has it, she wants to avoid eating. No relationship of this pain to urination,  positional, bowel movements). It is OK if she doesn't eat food.      She's not having any of those episodic sx currently and feels well     Every few weeks, her abd feels weird and she avoids eating which helps  -usually lasts a few days  -She went GFD for 3 weeks w/o change in sx and on reintroduction she did OK too. She has been tested NEG for celiac disease. No FHX of celiac to her knowledge.     BM pattern (progressively over the years)   She had been constipation once in last few years   -BM daily, usually 2 stools daily, not coupled, good evacuation in general   -on toilet for a few minutes  -good volume   -on average, she's having either a soft serve stool or oatmeal; 50/50 split  -if she eats a trigger food (spicy), will get looser stool . Doesn't seem to worsen with ETOH   -no mucus, food particles (corn but nothing else), oily droplets  -no bloody or black stools   -not seen with RD    Does have ongoing bloating that builds up gradually as day progresses     Did start vyvanse in past month but no incrased GI sx     Any relationship of her sx to GI   Got very car sick as a child - usually needing to vomit as well as a child   FDGard   Weight - no unintentional weight loss   Appetite - stable    Family Hx  PGrandmother sister - CRC, aged 50  Cousin - brain cancer, dx age 30     Breast cancer in family (grandmother siblings)   States she went through genome testing, 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    PREVIOUS  SURGERIES:  No past surgical history on file.    ALLERGIES:  Allergies   Allergen Reactions     Terbinafine Rash     PERTINENT MEDICATIONS:    Current Outpatient Medications:      lisdexamfetamine (VYVANSE) 10 MG capsule, Take 1 capsule (10 mg) by mouth every morning., Disp: 30 capsule, Rfl: 0     lisdexamfetamine (VYVANSE) 30 MG capsule, Take 1 capsule (30 mg) by mouth every morning., Disp: 30 capsule, Rfl: 0     ondansetron (ZOFRAN ODT) 4 MG ODT tab, Take 1 tablet (4 mg) by mouth every 8 hours as needed for nausea., Disp: 20 tablet, Rfl: 3    SOCIAL HISTORY:  Social History     Socioeconomic History     Marital status: Single     Spouse name: Not on file     Number of children: Not on file     Years of education: Not on file     Highest education level: Not on file   Occupational History     Not on file   Tobacco Use     Smoking status: Never     Passive exposure: Never     Smokeless tobacco: Never   Vaping Use     Vaping status: Never Used   Substance and Sexual Activity     Alcohol use: Yes     Alcohol/week: 7.0 standard drinks of alcohol     Types: 7 Shots of liquor per week     Drug use: Not Currently     Sexual activity: Not on file   Other Topics Concern     Not on file   Social History Narrative     Not on file     Social Drivers of Health     Financial Resource Strain: Low Risk  (4/13/2025)    Financial Resource Strain      Within the past 12 months, have you or your family members you live with been unable to get utilities (heat, electricity) when it was really needed?: No   Food Insecurity: Low Risk  (4/13/2025)    Food Insecurity      Within the past 12 months, did you worry that your food would run out before you got money to buy more?: No      Within the past 12 months, did the food you bought just not last and you didn t have money to get more?: No   Transportation Needs: Low Risk  (4/13/2025)    Transportation Needs      Within the past 12 months, has lack of transportation kept you from medical  appointments, getting your medicines, non-medical meetings or appointments, work, or from getting things that you need?: No   Physical Activity: Insufficiently Active (4/13/2025)    Exercise Vital Sign      Days of Exercise per Week: 3 days      Minutes of Exercise per Session: 30 min   Stress: No Stress Concern Present (4/13/2025)    [facility] of Occupational Health - Occupational Stress Questionnaire      Feeling of Stress : Only a little   Recent Concern: Stress - Stress Concern Present (3/13/2025)    [facility] of Occupational Health - Occupational Stress Questionnaire      Feeling of Stress : Rather much   Social Connections: Unknown (4/13/2025)    Social Connection and Isolation Panel [NHANES]      Frequency of Communication with Friends and Family: Not on file      Frequency of Social Gatherings with Friends and Family: Three times a week      Attends Bahai Services: Not on file      Active Member of Clubs or Organizations: Not on file      Attends Club or Organization Meetings: Not on file      Marital Status: Not on file   Interpersonal Safety: Low Risk  (4/18/2025)    Interpersonal Safety      Do you feel physically and emotionally safe where you currently live?: Yes      Within the past 12 months, have you been hit, slapped, kicked or otherwise physically hurt by someone?: No      Within the past 12 months, have you been humiliated or emotionally abused in other ways by your partner or ex-partner?: No   Housing Stability: Low Risk  (4/13/2025)    Housing Stability      Do you have housing? : Yes      Are you worried about losing your housing?: No     FAMILY HISTORY:  Family History   Problem Relation Age of Onset     Anxiety Disorder Mother      No Known Problems Father      No Known Problems Brother      Alzheimer Disease Maternal Grandmother      Cerebrovascular Disease Maternal Grandfather      Parkinsonism Maternal Grandfather      Prostate Cancer Paternal Grandfather 74     Breast  "Cancer Paternal Aunt 42     Genetic Disorder Paternal Aunt         check 2     Genetic Disorder Cousin         Check 2     Brain Cancer Cousin        Past/family/social history reviewed and no changes    PHYSICAL EXAMINATION:  Vitals reviewed: Ht 1.626 m (5' 4\")   Wt 64 kg (141 lb)   LMP 03/06/2025 (Exact Date)   BMI 24.20 kg/m    Video physical exam  General: Patient appears well in no acute distress.   Skin: No visualized rash or lesions on visualized skin  Eyes: EOMI, no erythema, sclera icterus or discharge noted  Resp: Appears to be breathing comfortably without accessory muscle usage, speaking in full sentences, no cough  MSK: Appears to have normal range of motion based on visualized movements  Neurologic: No apparent tremors, facial movements symmetric  Psych: affect normal, alert and oriented    PERTINENT STUDIES:    Last Comprehensive Metabolic Panel:  Lab Results   Component Value Date     05/21/2024    POTASSIUM 4.2 05/21/2024    CHLORIDE 104 05/21/2024    CO2 28 05/21/2024    ANIONGAP 10 05/21/2024    GLC 86 05/21/2024    BUN 6.8 05/21/2024    CR 0.91 05/21/2024    GFRESTIMATED 84 05/21/2024    AMINATA 9.2 05/21/2024     TSH   Date Value Ref Range Status   05/21/2024 1.18 0.30 - 4.20 uIU/mL Final     PREVIOUS ENDOSCOPY    No results found for this or any previous visit.      Again, thank you for allowing me to participate in the care of your patient.        Sincerely,        Krystal Seay PA-C    Electronically signed"

## 2025-04-22 NOTE — PROGRESS NOTES
Virtual Visit Details    Type of service:  Video Visit     Originating Location (pt. Location): Home    Distant Location (provider location):  Off-site  Platform used for Video Visit: Prasanth    Joined the call at 2025, 1:08:22 pm.  Left the call at 2025, 1:44:01 pm.  You were on the call for 35 minutes 38 seconds.    GI CLINIC VISIT    CC/REFERRING MD:  Pamela Robb  REASON FOR CONSULTATION: nausea    ASSESSMENT/PLAN:  Diana Rothman is a 35 year old year old female with PMHx of ADHD (on Vyvanse - no correlation in GI sx) following with the [facility] GI group for GI distress characterized by days of abd upset, nausea, poor appetite that seems to worsen with certain foods (spicy foods) and improves with fasting. These episodes started in her 20s. She also notices these sx a week prior to her menstrual cycle starting. She is overall feeling well at baseline health. No alarm sx.     #GI distress - abd upset, nausea, poor appetite   Given chronicity (onset in her 20s), episodes are thought to be functional in nature. There could be a component of reflux as well. H.pylori testing was NEG. She is currently asymptomatic and at her baseline health. She does have baseline loose stools (soft serve, oatmeal) but no bloody stools or unintentional weight loss.  CBC, CMP, TSH were WNL     -fecal calpro and enteric panel ordered, though pattern, chronicity and stability makes it less likely to be either   -start of citrucel   -can trial IBGard or FDGard     Future consideration  1.GI RD, if agreeable   2. PPI trial during active episode  3. Bile acid sequestrant     Orders Placed This Encounter   Procedures    Calprotectin Feces    Enteric Bacteria and Virus Panel by TRINY Stool     Colorectal cancer screenin    RTC 4 mo or sooner PRN     Thank you for this consultation.  It was a pleasure to participate in the care of this patient; please contact me with any further questions.     Krystal Seay PA-C  Per complexity      HPI  Diana Rothman is a 35 year old year old female with PMHx of ADHD (on Vyvanse - no correlation in GI sx) following with the [facility] GI group for GI distress.     1. Chronic intermittent nausea along w/ GI sx - ongoing since mid 20s   -she actually felt OK and almost wanted to cancel the appt but decided to keep it as her sx do flare up intermittently   -she typically has AM nausea that is overall managed if she doesn't eat or avoids breakfast  -she has had to cancel her flight due to nausea and vomiting x 1 in last year, but vomiting in general is not common for her. This occurred about a few weeks prior to bad acid reflux episode - given PPI that resolved the sx in a few days then stopped it and has not had recurrence of sx. She has made some lifestyle changes  -in 3/17/25 she had a NEG h.pylori breath test, not on PPI     --had seen ENT years ago, also seen with SLP and reportedly told she may have silent reflux or LPRD - sx of increased throat clearing. Some changes have helped this but will have some bouts of it intermittently   -she travels a lot of work     In [place] Nov 2024, had GI upset. Abd pain, nausea, poor appetite .     Usually the week prior to her menstrual cycle she does have abd discomfort too - not usually a consistent thing     Before her period, she can have poor appetite and if she eats, she gets abd pain/discomfort (usually lower abdomen and feels like her food is not digesting, fullness and bloating and she had not been eating.) Pain started in past 3-4 years. Usually within a few hours of eating she will notice the abd pain and then when she has it, she wants to avoid eating. No relationship of this pain to urination, positional, bowel movements). It is OK if she doesn't eat food.      She's not having any of those episodic sx currently and feels well     Every few weeks, her abd feels weird and she avoids eating which helps  -usually lasts a few days  -She went GFD for 3 weeks  w/o change in sx and on reintroduction she did OK too. She has been tested NEG for celiac disease. No FHX of celiac to her knowledge.     BM pattern (progressively over the years)   She had been constipation once in last few years   -BM daily, usually 2 stools daily, not coupled, good evacuation in general   -on toilet for a few minutes  -good volume   -on average, she's having either a soft serve stool or oatmeal; 50/50 split  -if she eats a trigger food (spicy), will get looser stool . Doesn't seem to worsen with ETOH   -no mucus, food particles (corn but nothing else), oily droplets  -no bloody or black stools   -not seen with RD    Does have ongoing bloating that builds up gradually as day progresses     Did start vyvanse in past month but no incrased GI sx     Any relationship of her sx to GI   Got very car sick as a child - usually needing to vomit as well as a child   FDGard   Weight - no unintentional weight loss   Appetite - stable    Family Hx  PGrandmother sister - CRC, aged 50  Cousin - brain cancer, dx age 30     Breast cancer in family (grandmother siblings)   States she went through genome testing, 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    PREVIOUS SURGERIES:  No past surgical history on file.    ALLERGIES:  Allergies   Allergen Reactions    Terbinafine Rash     PERTINENT MEDICATIONS:    Current Outpatient Medications:     lisdexamfetamine (VYVANSE) 10 MG capsule, Take 1 capsule (10 mg) by mouth every morning., Disp: 30  capsule, Rfl: 0    lisdexamfetamine (VYVANSE) 30 MG capsule, Take 1 capsule (30 mg) by mouth every morning., Disp: 30 capsule, Rfl: 0    ondansetron (ZOFRAN ODT) 4 MG ODT tab, Take 1 tablet (4 mg) by mouth every 8 hours as needed for nausea., Disp: 20 tablet, Rfl: 3    SOCIAL HISTORY:  Social History     Socioeconomic History    Marital status: Single     Spouse name: Not on file    Number of children: Not on file    Years of education: Not on file    Highest education level: Not on file   Occupational History    Not on file   Tobacco Use    Smoking status: Never     Passive exposure: Never    Smokeless tobacco: Never   Vaping Use    Vaping status: Never Used   Substance and Sexual Activity    Alcohol use: Yes     Alcohol/week: 7.0 standard drinks of alcohol     Types: 7 Shots of liquor per week    Drug use: Not Currently    Sexual activity: Not on file   Other Topics Concern    Not on file   Social History Narrative    Not on file     Social Drivers of Health     Financial Resource Strain: Low Risk  (4/13/2025)    Financial Resource Strain     Within the past 12 months, have you or your family members you live with been unable to get utilities (heat, electricity) when it was really needed?: No   Food Insecurity: Low Risk  (4/13/2025)    Food Insecurity     Within the past 12 months, did you worry that your food would run out before you got money to buy more?: No     Within the past 12 months, did the food you bought just not last and you didn t have money to get more?: No   Transportation Needs: Low Risk  (4/13/2025)    Transportation Needs     Within the past 12 months, has lack of transportation kept you from medical appointments, getting your medicines, non-medical meetings or appointments, work, or from getting things that you need?: No   Physical Activity: Insufficiently Active (4/13/2025)    Exercise Vital Sign     Days of Exercise per Week: 3 days     Minutes of Exercise per Session: 30 min   Stress: No  "Stress Concern Present (4/13/2025)    [employer] [place] [employer] - Occupational Stress Questionnaire     Feeling of Stress : Only a little   Recent Concern: Stress - Stress Concern Present (3/13/2025)    [employer] [place] [employer] - Occupational Stress Questionnaire     Feeling of Stress : Rather much   Social Connections: Unknown (4/13/2025)    Social Connection and Isolation Panel [NHANES]     Frequency of Communication with Friends and Family: Not on file     Frequency of Social Gatherings with Friends and Family: Three times a week     Attends Denominational Services: Not on file     Active Member of Clubs or Organizations: Not on file     Attends Club or Organization Meetings: Not on file     Marital Status: Not on file   Interpersonal Safety: Low Risk  (4/18/2025)    Interpersonal Safety     Do you feel physically and emotionally safe where you currently live?: Yes     Within the past 12 months, have you been hit, slapped, kicked or otherwise physically hurt by someone?: No     Within the past 12 months, have you been humiliated or emotionally abused in other ways by your partner or ex-partner?: No   Housing Stability: Low Risk  (4/13/2025)    Housing Stability     Do you have housing? : Yes     Are you worried about losing your housing?: No     FAMILY HISTORY:  Family History   Problem Relation Age of Onset    Anxiety Disorder Mother     No Known Problems Father     No Known Problems Brother     Alzheimer Disease Maternal Grandmother     Cerebrovascular Disease Maternal Grandfather     Parkinsonism Maternal Grandfather     Prostate Cancer Paternal Grandfather 74    Breast Cancer Paternal Aunt 42    Genetic Disorder Paternal Aunt         check 2    Genetic Disorder Cousin         Check 2    Brain Cancer Cousin        Past/family/social history reviewed and no changes    PHYSICAL EXAMINATION:  Vitals reviewed: Ht 1.626 m (5' 4\")   Wt 64 kg (141 lb)   LMP 03/06/2025 (Exact Date)   BMI " 24.20 kg/m    Video physical exam  General: Patient appears well in no acute distress.   Skin: No visualized rash or lesions on visualized skin  Eyes: EOMI, no erythema, sclera icterus or discharge noted  Resp: Appears to be breathing comfortably without accessory muscle usage, speaking in full sentences, no cough  MSK: Appears to have normal range of motion based on visualized movements  Neurologic: No apparent tremors, facial movements symmetric  Psych: affect normal, alert and oriented    PERTINENT STUDIES:    Last Comprehensive Metabolic Panel:  Lab Results   Component Value Date     05/21/2024    POTASSIUM 4.2 05/21/2024    CHLORIDE 104 05/21/2024    CO2 28 05/21/2024    ANIONGAP 10 05/21/2024    GLC 86 05/21/2024    BUN 6.8 05/21/2024    CR 0.91 05/21/2024    GFRESTIMATED 84 05/21/2024    AMINATA 9.2 05/21/2024     TSH   Date Value Ref Range Status   05/21/2024 1.18 0.30 - 4.20 uIU/mL Final     PREVIOUS ENDOSCOPY    No results found for this or any previous visit.

## 2025-04-22 NOTE — PATIENT INSTRUCTIONS
It was a pleasure taking care of you today.  I've included a brief summary of our discussion and care plan from today's visit below.  Please review this information with your primary care provider.  _______________________________________________________________________    My recommendations are summarized as follows:    Daily fiber with Citrucel - see dosing at bottom of page   Stool tests - OK to get done at any [facility] lab  Can consider trial of IBGard or FDGard - over the counter. Can help cut down on GI distress    Return to GI Clinic in 4 months to review your progress.     Please see below for any additional questions and scheduling guidelines.    Sign up for [employer]: [employer] patient portal serves as a secure platform for accessing your medical records from the [facility]. Additionally, [employer] facilitates easy, timely, and secure messaging with your care team. If you have not signed up, you may do so by using the provided code or calling [phone].    Coordinating your care after your visit:  There are multiple options for scheduling your follow-up care based on your provider's recommendation.    How do I schedule a follow-up clinic appointment:   After your appointment, you may receive scheduling assistance with the Clinic Coordinators by having a seat in the waiting room and a Clinic Coordinator will call you up to schedule.  Virtual visits or after you leave the clinic:  Your provider has placed a follow-up order in the [employer] portal for scheduling your return appointment. A member of the scheduling team will contact you to schedule.  LessThan3t Scheduling: Timely scheduling through [employer] is advised to ensure appointment availability.   Call to schedule: You may schedule your follow-up appointment(s) by calling [phone], option 1.    How do I schedule my endoscopy or colonoscopy procedure:  If a procedure, such as a colonoscopy or upper endoscopy was ordered by your provider,  the scheduling team will contact you to schedule this procedure. Or you may choose to call to schedule at   [phone], option 2.  Please allow 20-30 minutes when scheduling a procedure.    How do I get my blood work done? To get your blood work done, you need to schedule a lab appointment at an [facility]. There are multiple ways to schedule:   At the clinic: The Clinic Coordinator you meet after your visit can help you schedule a lab appointment.   [facility] scheduling: [employer] offers online lab scheduling at all [facility] laboratory locations.   Call to schedule: You can call [phone] to schedule your lab appointment.    How do I schedule my imaging study: To schedule imaging studies, such as CT scans, ultrasounds, MRIs, or X-rays, contact Imaging Services at [phone].    How do I schedule a referral to another doctor: If your provider recommended a referral to another specialist(s), the referral order was placed by your provider. You will receive a phone call to schedule this referral, or you may choose to call the number attached to the referral to self-schedule.    For Post-Visit Question(s):  For any inquiries following today's visit:  Please utilize [employer] messaging and allow 48 hours for reply or contact the Call Center during normal business hours at [phone], option 3.  For Emergent After-hours questions, contact the On-Call GI Fellow through the [facility]  at [phone].  In addition, you may contact your Nurse directly using the provided contact information.    Test Results:  Test results will be accessible via [employer] in compliance with the 21st Century Cures Act. This means that your results will be available to you at the same time as your provider. Often you may see your results before your provider does. Results are reviewed by staff within two weeks with communication follow-up. Results may be released in the patient portal prior to your  care team review.    Prescription Refill(s):  Medication prescribed by your provider will be addressed during your visit. For future refills, please coordinate with your pharmacy. If you have not had a recent clinic visit or routine labs, for your safety, your provider may not be able to refill your prescription.     Sincerely,    Krystal Seay PA-C  Gastroenterology  ---  Citrucel Fiber  Here are two forms of Citrucel you can try. In general, the powder form is more effective than capsules or gummies, but capsules/gummies are great as well. See below for the starting dosage of fiber and the plan for titration (dose increase). Pick one option.     -Start soluble fiber supplementation with Citrucel powder - this should be taken daily. Start with 1/2 tablespoon mixed with large glass of water. Slowly increase dose by 1/2 tablespoon every 1-2 weeks until desired stool consistency is achieved (up to 2-3 tablespoons per day). For example start with 1/2 tablespoon, then after a week incease to 1 tablespoon, then 1.5 tablespoons, then 2 tablespoons, etc.    -Start taking Citrucel (methylcellulose) fiber caplets.  Start with 2 caplets in the evening and slowly increase as tolerated to effective dose (for example - increasing by 1-2 caps a week). Max dose is 2 caplets up to 6 times per day (12 caplets per day). You may experience bloating or discomfort, important to go slow and increase as tolerated, can take a few months to see full effect of fiber therapy.

## 2025-04-24 LAB
BKR AP ASSOCIATED HPV REPORT: ABNORMAL
BKR LAB AP GYN ADEQUACY: ABNORMAL
BKR LAB AP GYN INTERPRETATION: ABNORMAL
BKR LAB AP PREVIOUS ABNL DX: ABNORMAL
BKR LAB AP PREVIOUS ABNORMAL: ABNORMAL
PATH REPORT.COMMENTS IMP SPEC: ABNORMAL
PATH REPORT.COMMENTS IMP SPEC: ABNORMAL
PATH REPORT.RELEVANT HX SPEC: ABNORMAL

## 2025-04-30 ENCOUNTER — TELEPHONE (OUTPATIENT)
Dept: GASTROENTEROLOGY | Facility: CLINIC | Age: 35
End: 2025-04-30
Payer: COMMERCIAL

## 2025-05-06 ENCOUNTER — HOSPITAL ENCOUNTER (OUTPATIENT)
Dept: MRI IMAGING | Facility: HOSPITAL | Age: 35
Discharge: HOME | End: 2025-05-06
Attending: FAMILY MEDICINE | Admitting: FAMILY MEDICINE
Payer: COMMERCIAL

## 2025-05-06 DIAGNOSIS — Z15.09: ICD-10-CM

## 2025-05-06 DIAGNOSIS — Z12.31 VISIT FOR SCREENING MAMMOGRAM: ICD-10-CM

## 2025-05-06 PROCEDURE — 255N000002 HC RX 255 OP 636: Performed by: FAMILY MEDICINE

## 2025-05-06 PROCEDURE — A9585 GADOBUTROL INJECTION: HCPCS | Performed by: FAMILY MEDICINE

## 2025-05-06 PROCEDURE — 77049 MRI BREAST C-+ W/CAD BI: CPT

## 2025-05-06 RX ORDER — GADOBUTROL 604.72 MG/ML
0.1 INJECTION INTRAVENOUS ONCE
Status: COMPLETED | OUTPATIENT
Start: 2025-05-06 | End: 2025-05-06

## 2025-05-06 RX ADMIN — GADOBUTROL 6 ML: 604.72 INJECTION INTRAVENOUS at 16:21

## 2025-05-07 ENCOUNTER — THERAPY VISIT (OUTPATIENT)
Dept: PHYSICAL THERAPY | Facility: CLINIC | Age: 35
End: 2025-05-07
Payer: COMMERCIAL

## 2025-05-07 ENCOUNTER — LAB (OUTPATIENT)
Dept: LAB | Facility: CLINIC | Age: 35
End: 2025-05-07
Payer: COMMERCIAL

## 2025-05-07 ENCOUNTER — RESULTS FOLLOW-UP (OUTPATIENT)
Dept: FAMILY MEDICINE | Facility: CLINIC | Age: 35
End: 2025-05-07

## 2025-05-07 DIAGNOSIS — R19.5 LOOSE STOOLS: ICD-10-CM

## 2025-05-07 DIAGNOSIS — S90.31XD CONTUSION OF RIGHT FOOT, SUBSEQUENT ENCOUNTER: Primary | ICD-10-CM

## 2025-05-07 DIAGNOSIS — Z12.31 VISIT FOR SCREENING MAMMOGRAM: Primary | ICD-10-CM

## 2025-05-07 PROCEDURE — 97110 THERAPEUTIC EXERCISES: CPT | Mod: GP

## 2025-05-07 PROCEDURE — 87507 IADNA-DNA/RNA PROBE TQ 12-25: CPT

## 2025-05-07 PROCEDURE — 97140 MANUAL THERAPY 1/> REGIONS: CPT | Mod: GP

## 2025-05-08 ENCOUNTER — PATIENT OUTREACH (OUTPATIENT)
Dept: CARE COORDINATION | Facility: CLINIC | Age: 35
End: 2025-05-08

## 2025-05-08 LAB
ADV 40+41 DNA STL QL NAA+NON-PROBE: NEGATIVE
ASTRO TYP 1-8 RNA STL QL NAA+NON-PROBE: NEGATIVE
C CAYETANENSIS DNA STL QL NAA+NON-PROBE: NEGATIVE
CAMPYLOBACTER DNA SPEC NAA+PROBE: NEGATIVE
CRYPTOSP DNA STL QL NAA+NON-PROBE: NEGATIVE
E COLI O157 DNA STL QL NAA+NON-PROBE: NORMAL
E HISTOLYT DNA STL QL NAA+NON-PROBE: NEGATIVE
EAEC ASTA GENE ISLT QL NAA+PROBE: NEGATIVE
EC STX1+STX2 GENES STL QL NAA+NON-PROBE: NEGATIVE
EPEC EAE GENE STL QL NAA+NON-PROBE: NEGATIVE
ETEC LTA+ST1A+ST1B TOX ST NAA+NON-PROBE: NEGATIVE
G LAMBLIA DNA STL QL NAA+NON-PROBE: NEGATIVE
NOROVIRUS GI+II RNA STL QL NAA+NON-PROBE: NEGATIVE
P SHIGELLOIDES DNA STL QL NAA+NON-PROBE: NEGATIVE
RVA RNA STL QL NAA+NON-PROBE: NEGATIVE
SALMONELLA SP RPOD STL QL NAA+PROBE: NEGATIVE
SAPO I+II+IV+V RNA STL QL NAA+NON-PROBE: NEGATIVE
SHIGELLA SP+EIEC IPAH ST NAA+NON-PROBE: NEGATIVE
V CHOLERAE DNA SPEC QL NAA+PROBE: NEGATIVE
VIBRIO DNA SPEC NAA+PROBE: NEGATIVE
Y ENTEROCOL DNA STL QL NAA+PROBE: NEGATIVE

## 2025-05-09 ENCOUNTER — RESULTS FOLLOW-UP (OUTPATIENT)
Dept: GASTROENTEROLOGY | Facility: CLINIC | Age: 35
End: 2025-05-09

## 2025-05-09 ENCOUNTER — ANCILLARY PROCEDURE (OUTPATIENT)
Dept: MAMMOGRAPHY | Facility: CLINIC | Age: 35
End: 2025-05-09
Attending: FAMILY MEDICINE
Payer: COMMERCIAL

## 2025-05-09 DIAGNOSIS — Z12.31 VISIT FOR SCREENING MAMMOGRAM: ICD-10-CM

## 2025-05-09 PROCEDURE — 77067 SCR MAMMO BI INCL CAD: CPT | Mod: TC | Performed by: RADIOLOGY

## 2025-05-09 PROCEDURE — 77063 BREAST TOMOSYNTHESIS BI: CPT | Mod: TC | Performed by: RADIOLOGY

## 2025-05-21 ENCOUNTER — THERAPY VISIT (OUTPATIENT)
Dept: PHYSICAL THERAPY | Facility: CLINIC | Age: 35
End: 2025-05-21
Payer: COMMERCIAL
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
## 2025-05-21 DIAGNOSIS — S90.31XD CONTUSION OF RIGHT FOOT, SUBSEQUENT ENCOUNTER: Primary | ICD-10-CM

## 2025-05-21 PROCEDURE — 97110 THERAPEUTIC EXERCISES: CPT | Mod: GP
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
## 2025-06-09 ENCOUNTER — OFFICE VISIT (OUTPATIENT)
Dept: OBGYN | Facility: CLINIC | Age: 35
End: 2025-06-09
Payer: COMMERCIAL

## 2025-06-09 ENCOUNTER — RESULTS FOLLOW-UP (OUTPATIENT)
Dept: OBGYN | Facility: CLINIC | Age: 35
End: 2025-06-09

## 2025-06-09 VITALS
BODY MASS INDEX: 23.86 KG/M2 | SYSTOLIC BLOOD PRESSURE: 132 MMHG | TEMPERATURE: 98.2 F | WEIGHT: 139 LBS | HEART RATE: 78 BPM | DIASTOLIC BLOOD PRESSURE: 74 MMHG

## 2025-06-09 DIAGNOSIS — R87.612 LGSIL ON PAP SMEAR OF CERVIX: ICD-10-CM

## 2025-06-09 DIAGNOSIS — R87.810 CERVICAL HIGH RISK HPV (HUMAN PAPILLOMAVIRUS) TEST POSITIVE: Primary | ICD-10-CM

## 2025-06-09 LAB — HCG UR QL: NEGATIVE

## 2025-06-09 PROCEDURE — 3075F SYST BP GE 130 - 139MM HG: CPT | Performed by: OBSTETRICS & GYNECOLOGY

## 2025-06-09 PROCEDURE — 57455 BIOPSY OF CERVIX W/SCOPE: CPT | Performed by: OBSTETRICS & GYNECOLOGY

## 2025-06-09 PROCEDURE — 81025 URINE PREGNANCY TEST: CPT | Performed by: OBSTETRICS & GYNECOLOGY

## 2025-06-09 PROCEDURE — 88305 TISSUE EXAM BY PATHOLOGIST: CPT | Performed by: STUDENT IN AN ORGANIZED HEALTH CARE EDUCATION/TRAINING PROGRAM

## 2025-06-09 PROCEDURE — 3078F DIAST BP <80 MM HG: CPT | Performed by: OBSTETRICS & GYNECOLOGY

## 2025-06-09 NOTE — PROGRESS NOTES
GYN CLINIC VISIT  6/9/2025  CC: colposcopy    HPI:  Diana Rothman is a 35 year old female No obstetric history on file. who presents for initial colposcopy, referred by Pamela Robb. Pap smear on 04/18/25 showed: ASCUS and with high risk HPV present: HPV+ other. The pap history is as follows:    03/16/22 ASCUS Pap +HR HPV (not 16 or 18)  04/22/22 [place] Bx and ECC No LAW   All above in care everywhere   04/24/24 LSIL Pap, +HR HPV (not 16 or 18) Plan cotest in 1 year due 04/24/25. Results and recommendations released to the pt through [employer].  Seen by patient Diana Rothman on 5/2/2024 11:31 PM  04/18/25 ASCUS Pap, +HR HPV (not 16 or 18) Plan [place] due bef 07/18/25    UPT today is negative  Patient does not smoke  Type of contraception: none  Past GYN history: HPV  Prior cervical/vaginal disease: none.  Prior cervical treatment: no treatment.    Vitals:    06/09/25 1131   BP: 132/74   Pulse: 78   Temp: 98.2  F (36.8  C)   Weight: 63 kg (139 lb)       PROCEDURE:  Before the procedure, it was ensured that the patient was educated regarding the nature of her findings to date, the implications, and what was to be done. She has been made aware of the role of HPV, the natural history of infection, ways to minimize her future risk, the effect of HPV on the cervix, and treatment options available should they be indicated. The details of the colposcopic procedure were reviewed. All questions were answered before proceeding, and informed consent was therefore obtained.      Speculum placed in vagina and excellent visualization of cervix achieved. Cervix nulliparous and normal appearing. Cervix swabbed x 3 with acetic acid solution.      FINDINGS:  Cervix: acetowhitening noted at 8 o'clock. Representative biopsy taken with tischler forceps at 8 o'clock.   SCJ seen?: yes   ECC done?: No  Satisfactory examination?: yes      ASSESSMENT: LAW 1.    PLAN: specimens labelled and sent to Pathology, will base further treatment  on Pathology findings, treatment options discussed with patient, post biopsy instructions given to patient, and call to discuss Pathology results if treatment indicated but plan mychart message if no treatment indicated.    1. Cervical high risk HPV (human papillomavirus) test positive (Primary)    - HCG Qual, Urine ([id number])  - Surgical Pathology Exam  - NM PELVIC EXAMINATION  - COLP CERVIX/UPPER VAGINA W BX CERVIX    2. LGSIL on Pap smear of cervix    - Surgical Pathology Exam  - NM PELVIC EXAMINATION  - COLP CERVIX/UPPER VAGINA W BX CERVIX      Lizz Sutton MD

## 2025-06-11 LAB
PATH REPORT.COMMENTS IMP SPEC: NORMAL
PATH REPORT.COMMENTS IMP SPEC: NORMAL
PATH REPORT.FINAL DX SPEC: NORMAL
PATH REPORT.GROSS SPEC: NORMAL
PATH REPORT.MICROSCOPIC SPEC OTHER STN: NORMAL
PATH REPORT.RELEVANT HX SPEC: NORMAL
PHOTO IMAGE: NORMAL

## 2025-06-12 NOTE — TELEPHONE ENCOUNTER
Diana Rothman is a 35 year old who underwent colposcopy on 6/9/25. I called patient to follow up to see how she is feeling after the procedure and to discuss pathology.      Pathology showed   A.  Cervix, 8:00, biopsy:  - Low-grade squamous intraepithelial lesion (cervical intraepithelial neoplasia; LAW-1) with focal changes concerning for high-grade squamous intraepithelial lesion (cervical intraepithelial neoplasia;LAW 2).  - Rare unremarkable endocervical glands.        Discussed options -   LEEP or close surveillance with repeat colposcopy  Patient interested in LEEP  Her insurance does not cover [facility] as well as other places so [facility], then get us the records so we can follow up.    Lizz Sutton MD